# Patient Record
Sex: FEMALE | Race: WHITE | NOT HISPANIC OR LATINO | Employment: FULL TIME | URBAN - METROPOLITAN AREA
[De-identification: names, ages, dates, MRNs, and addresses within clinical notes are randomized per-mention and may not be internally consistent; named-entity substitution may affect disease eponyms.]

---

## 2020-01-04 ENCOUNTER — OFFICE VISIT (OUTPATIENT)
Dept: URGENT CARE | Facility: CLINIC | Age: 45
End: 2020-01-04
Payer: COMMERCIAL

## 2020-01-04 VITALS
DIASTOLIC BLOOD PRESSURE: 82 MMHG | WEIGHT: 196 LBS | TEMPERATURE: 99 F | HEART RATE: 68 BPM | BODY MASS INDEX: 30.76 KG/M2 | SYSTOLIC BLOOD PRESSURE: 124 MMHG | OXYGEN SATURATION: 97 % | HEIGHT: 67 IN

## 2020-01-04 DIAGNOSIS — R10.13 EPIGASTRIC PAIN: Primary | ICD-10-CM

## 2020-01-04 LAB
BILIRUB UR QL STRIP: NEGATIVE
GLUCOSE UR QL STRIP: NEGATIVE
KETONES UR QL STRIP: NEGATIVE
LEUKOCYTE ESTERASE UR QL STRIP: NEGATIVE
PH, POC UA: 6 (ref 5–8)
POC BLOOD, URINE: NEGATIVE
POC NITRATES, URINE: NEGATIVE
PROT UR QL STRIP: NEGATIVE
SP GR UR STRIP: 1.01 (ref 1–1.03)
UROBILINOGEN UR STRIP-ACNC: NORMAL (ref 0.1–1.1)

## 2020-01-04 PROCEDURE — 74019 XR ABDOMEN FLAT AND ERECT: ICD-10-PCS | Mod: FY,S$GLB,, | Performed by: RADIOLOGY

## 2020-01-04 PROCEDURE — 86677 HELICOBACTER PYLORI ANTIBODY: CPT

## 2020-01-04 PROCEDURE — 93010 EKG 12-LEAD: ICD-10-PCS | Mod: S$GLB,,, | Performed by: INTERNAL MEDICINE

## 2020-01-04 PROCEDURE — 74019 RADEX ABDOMEN 2 VIEWS: CPT | Mod: FY,S$GLB,, | Performed by: RADIOLOGY

## 2020-01-04 PROCEDURE — 93010 ELECTROCARDIOGRAM REPORT: CPT | Mod: S$GLB,,, | Performed by: INTERNAL MEDICINE

## 2020-01-04 PROCEDURE — 81003 URINALYSIS AUTO W/O SCOPE: CPT | Mod: QW,S$GLB,, | Performed by: NURSE PRACTITIONER

## 2020-01-04 PROCEDURE — 81003 POCT URINALYSIS, DIPSTICK, MANUAL, W/O SCOPE: ICD-10-PCS | Mod: QW,S$GLB,, | Performed by: NURSE PRACTITIONER

## 2020-01-04 PROCEDURE — 93005 EKG 12-LEAD: ICD-10-PCS | Mod: S$GLB,,, | Performed by: NURSE PRACTITIONER

## 2020-01-04 PROCEDURE — 99215 OFFICE O/P EST HI 40 MIN: CPT | Mod: S$GLB,,, | Performed by: NURSE PRACTITIONER

## 2020-01-04 PROCEDURE — 99215 PR OFFICE/OUTPT VISIT, EST, LEVL V, 40-54 MIN: ICD-10-PCS | Mod: S$GLB,,, | Performed by: NURSE PRACTITIONER

## 2020-01-04 PROCEDURE — 93005 ELECTROCARDIOGRAM TRACING: CPT | Mod: S$GLB,,, | Performed by: NURSE PRACTITIONER

## 2020-01-04 RX ORDER — ONDANSETRON 4 MG/1
4 TABLET, FILM COATED ORAL DAILY PRN
Qty: 30 TABLET | Refills: 1 | Status: SHIPPED | OUTPATIENT
Start: 2020-01-04 | End: 2021-01-03

## 2020-01-04 RX ORDER — ACETAMINOPHEN 500 MG
1000 TABLET ORAL
Status: COMPLETED | OUTPATIENT
Start: 2020-01-04 | End: 2020-01-04

## 2020-01-04 RX ORDER — ONDANSETRON 4 MG/1
4 TABLET, ORALLY DISINTEGRATING ORAL
Status: COMPLETED | OUTPATIENT
Start: 2020-01-04 | End: 2020-01-04

## 2020-01-04 RX ADMIN — ONDANSETRON 4 MG: 4 TABLET, ORALLY DISINTEGRATING ORAL at 02:01

## 2020-01-04 RX ADMIN — Medication 1000 MG: at 02:01

## 2020-01-04 NOTE — PROGRESS NOTES
"Subjective:       Patient ID: Sunita Gan is a 44 y.o. female.    Vitals:  height is 5' 7" (1.702 m) and weight is 88.9 kg (196 lb). Her temperature is 98.5 °F (36.9 °C). Her blood pressure is 124/82 and her pulse is 68. Her oxygen saturation is 97%.     Chief Complaint: Abdominal Pain    Pt has mid abdominal pain where she feels like something is stuck in her stomach. Pt is having regular bowel movements, vomited last night.    Abdominal Pain   This is a new problem. Episode onset: 3 days. The pain is located in the generalized abdominal region. The pain is at a severity of 6/10. The quality of the pain is a sensation of fullness. The abdominal pain radiates to the epigastric region. Associated symptoms include nausea. Pertinent negatives include no constipation, diarrhea, dysuria, fever or vomiting. The pain is aggravated by eating. The pain is relieved by nothing. Treatments tried: increase fluids. There is no history of abdominal surgery.       Constitution: Negative for appetite change, chills, sweating and fever.   HENT: Negative for trouble swallowing.    Cardiovascular: Negative for chest pain.   Respiratory: Negative for shortness of breath.    Gastrointestinal: Positive for abdominal pain and nausea. Negative for abdominal trauma, abdominal bloating, history of abdominal surgery, vomiting, constipation, diarrhea, dark colored stools and heartburn.   Genitourinary: Negative for dysuria, missed menses and pelvic pain.   Musculoskeletal: Negative for back pain.       Objective:      Physical Exam      Assessment:       1. Epigastric pain        Plan:         Epigastric pain  -     IN OFFICE EKG 12-LEAD (to Muse)  -     POCT Urinalysis, Dipstick, Manual, W/O Scope  -     X-Ray Abdomen Flat And Erect  -     H.Pylori Antibody IgG  -     GI cocktail (mylanta 30 mL, lidocaine 2 % viscous 10 mL, dicyclomine 10 mL) 50 mL; Take 10 mLs by mouth 3 (three) times daily as needed.  Dispense: 100 mL; Refill: 0  -     " acetaminophen tablet 1,000 mg    Other orders  -     (pyxis) gi cocktail (mylanta 30 mL, lidocaine 2 % viscous 10 mL, dicyclomine 10 mL) 50 mL         disucssed with Dr Price assessment and differential dx  1) GI cocktail  2) Zofran 4mg ordered and given  3) Tylenol ordered and given  4) EGG: Normal sinus rhythm   5) abd xray ordered with no air space consolidation, no free air, no soft tissue masses noted    Discussed watchful waiting with patient if pain is worse then what it is right now then go to the ED for further evaluation   .Start with fever and chilld go to the ED.    Please return here or go to the Emergency Department for any concerns or worsening of condition.  If you were prescribed antibiotics, please take them to completion.  If you were prescribed a narcotic medication, do not drive or operate heavy equipment or machinery while taking these medications.  Please follow up with your primary care doctor or specialist as needed.    If you  smoke, please stop smoking.

## 2020-01-04 NOTE — PATIENT INSTRUCTIONS
disucssed with Dr Price assessment and differential dx    Discussed watchful waiting with patient if pain is worse then what it is right now then go to the ED for further evaluation   .Start with fever and chilld go to the ED.    Please return here or go to the Emergency Department for any concerns or worsening of condition.  If you were prescribed antibiotics, please take them to completion.  If you were prescribed a narcotic medication, do not drive or operate heavy equipment or machinery while taking these medications.  Please follow up with your primary care doctor or specialist as needed.

## 2020-01-08 ENCOUNTER — TELEPHONE (OUTPATIENT)
Dept: URGENT CARE | Facility: CLINIC | Age: 45
End: 2020-01-08

## 2020-01-08 LAB — H PYLORI IGG SERPL QL IA: NEGATIVE

## 2020-01-08 NOTE — TELEPHONE ENCOUNTER
----- Message from Nan Laureano MD sent at 1/8/2020 12:44 PM CST -----  Please notify that test for bacteria returned negative.  Recheck with PCP or to ER with any worsening symptoms

## 2022-07-14 NOTE — PROGRESS NOTES
Subjective:       Patient ID: Sunita Gan is a pleasant 47 y.o. White female patient    Chief Complaint: Establish Care      Patient is a pt new to me and to our practice.    HPI     She comes to establish care with new PCP. Moved to [place] to come back home, pleased of this. She is closer to her mother and her 13 year old daughter, Muna, is pleased to be here, she likes to have her cousins and grand mother closer.  Ms. Gan has been working on her lifestyle, she has a nutritionist, follows a program called [employer], and so far lost 20 pounds from 06/08/2022. She started to exercise too  She was placed on a statin in March and hopes to be able to hold it at some point due to her better lifestyle.  She has two sisters who got breast cancer but BRCA negative.   She had Td in 2016.       Patient Active Problem List   Diagnosis    BMI 29.0-29.9,adult    Hyperlipidemia          ACTIVE MEDICAL ISSUES:  Documented in Problem List     PAST MEDICAL HISTORY  Documented     PAST SURGICAL HISTORY:  Documented     SOCIAL HISTORY:  Documented     FAMILY HISTORY:  Documented     ALLERGIES AND MEDICATIONS: updated and reviewed.  Documented    Review of Systems   Constitutional: Negative for activity change and unexpected weight change.   HENT: Negative for hearing loss, rhinorrhea and trouble swallowing.    Eyes: Negative for discharge and visual disturbance.   Respiratory: Negative for chest tightness and wheezing.    Cardiovascular: Negative for chest pain and palpitations.   Gastrointestinal: Negative for blood in stool, constipation, diarrhea and vomiting.   Endocrine: Negative for polydipsia and polyuria.   Genitourinary: Negative for difficulty urinating, dysuria, hematuria and menstrual problem.   Musculoskeletal: Negative for arthralgias, joint swelling and neck pain.   Neurological: Negative for weakness and headaches.   Psychiatric/Behavioral: Negative for confusion and dysphoric mood.       Objective:     "  Physical Exam  Vitals and nursing note reviewed.   Constitutional:       Appearance: She is well-developed.   HENT:      Right Ear: External ear normal.      Left Ear: External ear normal.   Eyes:      Conjunctiva/sclera: Conjunctivae normal.   Neck:      Thyroid: No thyromegaly.   Cardiovascular:      Rate and Rhythm: Normal rate and regular rhythm.      Pulses: Normal pulses.      Heart sounds: Normal heart sounds.   Pulmonary:      Effort: Pulmonary effort is normal. No respiratory distress.      Breath sounds: Normal breath sounds. No wheezing.   Abdominal:      General: Bowel sounds are normal.      Palpations: Abdomen is soft. There is no mass.      Tenderness: There is no abdominal tenderness.   Musculoskeletal:         General: Normal range of motion.      Cervical back: Normal range of motion and neck supple.   Lymphadenopathy:      Cervical: No cervical adenopathy.   Skin:     General: Skin is warm and dry.   Neurological:      Mental Status: She is alert and oriented to person, place, and time. Mental status is at baseline.   Psychiatric:         Mood and Affect: Mood normal.         Behavior: Behavior normal.         Thought Content: Thought content normal.         Judgment: Judgment normal.         Vitals:    07/15/22 1411   BP: 118/70   BP Location: Right arm   Patient Position: Sitting   BP Method: Small (Manual)   Pulse: 75   Resp: 16   Temp: 98.9 °F (37.2 °C)   TempSrc: Oral   SpO2: 98%   Weight: 86.3 kg (190 lb 4.1 oz)   Height: 5' 7" (1.702 m)     Body mass index is 29.8 kg/m².    RESULTS: Reviewed labs in [facility].    Last Lab Results:     Lab Results   Component Value Date    WBC 11.53 (H) 05/19/2012    HGB 13.3 05/19/2012    HCT 39.0 05/19/2012     05/19/2012     05/19/2012    K 3.6 05/19/2012     05/19/2012    CO2 24 05/19/2012    BUN 11 05/19/2012    CREATININE 0.8 05/19/2012    CALCIUM 9.2 05/19/2012    ALBUMIN 4.0 05/19/2012    AST 15 05/19/2012    ALT 11 05/19/2012 "       Assessment:       1. Encounter for annual physical exam    2. Establishing care with new doctor, encounter for    3. BMI 29.0-29.9,adult    4. Hyperlipidemia, unspecified hyperlipidemia type    5. Screening for colon cancer    6. Encounter for screening mammogram for malignant neoplasm of breast        Plan:   Sunita was seen today for establish care.    Diagnoses and all orders for this visit:    Encounter for annual physical exam  -     CBC Auto Differential; Future  -     Comprehensive Metabolic Panel; Future  -     Hemoglobin A1C; Future  -     TSH; Future  -     Lipid Panel; Future  -     Hepatitis C Antibody; Future  -     HIV 1/2 Ag/Ab (4th Gen); Future  -     Ambulatory referral/consult to Obstetrics / Gynecology; Future    Will do usual blood work, discussed preventative measures, will refer for colono and mammo.    Establishing care with new doctor, encounter for    Discussed the importance of a good pt-doctor trust relationship. Provided pt with my contact.    BMI 29.0-29.9,adult    See HPI. Praised her for her nice efforts.    Hyperlipidemia, unspecified hyperlipidemia type  -     rosuvastatin (CRESTOR) 5 MG tablet; Take 1 tablet (5 mg total) by mouth once daily.    Will go on with same treatment for now, will monitor.    Screening for colon cancer  -     Case Request Endoscopy: COLONOSCOPY    Encounter for screening mammogram for malignant neoplasm of breast  -     Mammo Digital Diagnostic Bilat with [facility]; Future      No follow-ups on file.    This note was created by combination of typed  and M-Modal dictation.  Transcription errors may be present.  If there are any questions, please contact me.

## 2022-07-15 ENCOUNTER — OFFICE VISIT (OUTPATIENT)
Dept: FAMILY MEDICINE | Facility: CLINIC | Age: 47
End: 2022-07-15
Payer: COMMERCIAL

## 2022-07-15 VITALS
HEIGHT: 67 IN | HEART RATE: 75 BPM | RESPIRATION RATE: 16 BRPM | BODY MASS INDEX: 29.86 KG/M2 | OXYGEN SATURATION: 98 % | WEIGHT: 190.25 LBS | SYSTOLIC BLOOD PRESSURE: 118 MMHG | DIASTOLIC BLOOD PRESSURE: 70 MMHG | TEMPERATURE: 99 F

## 2022-07-15 DIAGNOSIS — Z76.89 ESTABLISHING CARE WITH NEW DOCTOR, ENCOUNTER FOR: ICD-10-CM

## 2022-07-15 DIAGNOSIS — Z12.11 SCREENING FOR COLON CANCER: ICD-10-CM

## 2022-07-15 DIAGNOSIS — E78.5 HYPERLIPIDEMIA, UNSPECIFIED HYPERLIPIDEMIA TYPE: ICD-10-CM

## 2022-07-15 DIAGNOSIS — Z00.00 ENCOUNTER FOR ANNUAL PHYSICAL EXAM: Primary | ICD-10-CM

## 2022-07-15 DIAGNOSIS — Z12.31 ENCOUNTER FOR SCREENING MAMMOGRAM FOR MALIGNANT NEOPLASM OF BREAST: ICD-10-CM

## 2022-07-15 PROCEDURE — 99386 PR PREVENTIVE VISIT,NEW,40-64: ICD-10-PCS | Mod: S$GLB,,, | Performed by: INTERNAL MEDICINE

## 2022-07-15 PROCEDURE — 3008F BODY MASS INDEX DOCD: CPT | Mod: CPTII,S$GLB,, | Performed by: INTERNAL MEDICINE

## 2022-07-15 PROCEDURE — 1159F MED LIST DOCD IN RCRD: CPT | Mod: CPTII,S$GLB,, | Performed by: INTERNAL MEDICINE

## 2022-07-15 PROCEDURE — 99999 PR PBB SHADOW E&M-EST. PATIENT-LVL V: ICD-10-PCS | Mod: PBBFAC,,, | Performed by: INTERNAL MEDICINE

## 2022-07-15 PROCEDURE — 3078F DIAST BP <80 MM HG: CPT | Mod: CPTII,S$GLB,, | Performed by: INTERNAL MEDICINE

## 2022-07-15 PROCEDURE — 1160F RVW MEDS BY RX/DR IN RCRD: CPT | Mod: CPTII,S$GLB,, | Performed by: INTERNAL MEDICINE

## 2022-07-15 PROCEDURE — 99386 PREV VISIT NEW AGE 40-64: CPT | Mod: S$GLB,,, | Performed by: INTERNAL MEDICINE

## 2022-07-15 PROCEDURE — 3074F PR MOST RECENT SYSTOLIC BLOOD PRESSURE < 130 MM HG: ICD-10-PCS | Mod: CPTII,S$GLB,, | Performed by: INTERNAL MEDICINE

## 2022-07-15 PROCEDURE — 3074F SYST BP LT 130 MM HG: CPT | Mod: CPTII,S$GLB,, | Performed by: INTERNAL MEDICINE

## 2022-07-15 PROCEDURE — 1159F PR MEDICATION LIST DOCUMENTED IN MEDICAL RECORD: ICD-10-PCS | Mod: CPTII,S$GLB,, | Performed by: INTERNAL MEDICINE

## 2022-07-15 PROCEDURE — 1160F PR REVIEW ALL MEDS BY PRESCRIBER/CLIN PHARMACIST DOCUMENTED: ICD-10-PCS | Mod: CPTII,S$GLB,, | Performed by: INTERNAL MEDICINE

## 2022-07-15 PROCEDURE — 3078F PR MOST RECENT DIASTOLIC BLOOD PRESSURE < 80 MM HG: ICD-10-PCS | Mod: CPTII,S$GLB,, | Performed by: INTERNAL MEDICINE

## 2022-07-15 PROCEDURE — 99999 PR PBB SHADOW E&M-EST. PATIENT-LVL V: CPT | Mod: PBBFAC,,, | Performed by: INTERNAL MEDICINE

## 2022-07-15 PROCEDURE — 3008F PR BODY MASS INDEX (BMI) DOCUMENTED: ICD-10-PCS | Mod: CPTII,S$GLB,, | Performed by: INTERNAL MEDICINE

## 2022-07-15 RX ORDER — ROSUVASTATIN CALCIUM 5 MG/1
5 TABLET, COATED ORAL DAILY
Qty: 90 TABLET | Refills: 3 | Status: SHIPPED | OUTPATIENT
Start: 2022-07-15 | End: 2023-07-23

## 2022-07-15 NOTE — PROGRESS NOTES
Health Maintenance Due   Topic     Hepatitis C Screening  CONSULT WITH PCP    Cervical Cancer Screening  CONSULT WITH PCP    Lipid Panel  CONSULT WITH PCP    HIV Screening  CONSULT WITH PCP    TETANUS VACCINE  CONSULT WITH PCP    Mammogram  CONSULT WITH PCP    COVID-19 Vaccine (3 - Booster for Pfizer series) Pt will call to schedule when ready     Colorectal Cancer Screening  CONSULT WITH PCP

## 2022-07-18 PROBLEM — E78.5 HYPERLIPIDEMIA: Status: ACTIVE | Noted: 2022-07-18

## 2022-07-25 ENCOUNTER — PATIENT MESSAGE (OUTPATIENT)
Dept: ADMINISTRATIVE | Facility: HOSPITAL | Age: 47
End: 2022-07-25
Payer: COMMERCIAL

## 2022-08-11 ENCOUNTER — OFFICE VISIT (OUTPATIENT)
Dept: URGENT CARE | Facility: CLINIC | Age: 47
End: 2022-08-11
Payer: COMMERCIAL

## 2022-08-11 VITALS
HEART RATE: 83 BPM | WEIGHT: 190.25 LBS | OXYGEN SATURATION: 97 % | RESPIRATION RATE: 16 BRPM | SYSTOLIC BLOOD PRESSURE: 121 MMHG | HEIGHT: 67 IN | BODY MASS INDEX: 29.86 KG/M2 | DIASTOLIC BLOOD PRESSURE: 80 MMHG | TEMPERATURE: 98 F

## 2022-08-11 DIAGNOSIS — H65.01 NON-RECURRENT ACUTE SEROUS OTITIS MEDIA OF RIGHT EAR: Primary | ICD-10-CM

## 2022-08-11 DIAGNOSIS — J03.90 EXUDATIVE TONSILLITIS: ICD-10-CM

## 2022-08-11 DIAGNOSIS — J02.9 SORE THROAT: ICD-10-CM

## 2022-08-11 LAB
CTP QC/QA: YES
CTP QC/QA: YES
MOLECULAR STREP A: NEGATIVE
SARS-COV-2 RDRP RESP QL NAA+PROBE: NEGATIVE

## 2022-08-11 PROCEDURE — 99213 OFFICE O/P EST LOW 20 MIN: CPT | Mod: S$GLB,,,

## 2022-08-11 PROCEDURE — 99213 PR OFFICE/OUTPT VISIT, EST, LEVL III, 20-29 MIN: ICD-10-PCS | Mod: S$GLB,,,

## 2022-08-11 PROCEDURE — U0002: ICD-10-PCS | Mod: QW,S$GLB,,

## 2022-08-11 PROCEDURE — 87651 POCT STREP A MOLECULAR: ICD-10-PCS | Mod: QW,S$GLB,,

## 2022-08-11 PROCEDURE — 3074F SYST BP LT 130 MM HG: CPT | Mod: CPTII,S$GLB,,

## 2022-08-11 PROCEDURE — 3079F PR MOST RECENT DIASTOLIC BLOOD PRESSURE 80-89 MM HG: ICD-10-PCS | Mod: CPTII,S$GLB,,

## 2022-08-11 PROCEDURE — 1160F PR REVIEW ALL MEDS BY PRESCRIBER/CLIN PHARMACIST DOCUMENTED: ICD-10-PCS | Mod: CPTII,S$GLB,,

## 2022-08-11 PROCEDURE — 3008F PR BODY MASS INDEX (BMI) DOCUMENTED: ICD-10-PCS | Mod: CPTII,S$GLB,,

## 2022-08-11 PROCEDURE — 1159F MED LIST DOCD IN RCRD: CPT | Mod: CPTII,S$GLB,,

## 2022-08-11 PROCEDURE — 3079F DIAST BP 80-89 MM HG: CPT | Mod: CPTII,S$GLB,,

## 2022-08-11 PROCEDURE — 1159F PR MEDICATION LIST DOCUMENTED IN MEDICAL RECORD: ICD-10-PCS | Mod: CPTII,S$GLB,,

## 2022-08-11 PROCEDURE — 87651 STREP A DNA AMP PROBE: CPT | Mod: QW,S$GLB,,

## 2022-08-11 PROCEDURE — U0002 COVID-19 LAB TEST NON-CDC: HCPCS | Mod: QW,S$GLB,,

## 2022-08-11 PROCEDURE — 3074F PR MOST RECENT SYSTOLIC BLOOD PRESSURE < 130 MM HG: ICD-10-PCS | Mod: CPTII,S$GLB,,

## 2022-08-11 PROCEDURE — 3008F BODY MASS INDEX DOCD: CPT | Mod: CPTII,S$GLB,,

## 2022-08-11 PROCEDURE — 1160F RVW MEDS BY RX/DR IN RCRD: CPT | Mod: CPTII,S$GLB,,

## 2022-08-11 RX ORDER — UBIDECARENONE 30 MG
30 CAPSULE ORAL
COMMUNITY

## 2022-08-11 RX ORDER — AMOXICILLIN AND CLAVULANATE POTASSIUM 875; 125 MG/1; MG/1
1 TABLET, FILM COATED ORAL 2 TIMES DAILY
Qty: 20 TABLET | Refills: 0 | Status: SHIPPED | OUTPATIENT
Start: 2022-08-11 | End: 2022-08-21

## 2022-08-11 RX ORDER — MULTIVITAMIN
1 TABLET ORAL
COMMUNITY

## 2022-08-11 NOTE — PROGRESS NOTES
"Subjective:       Patient ID: Sunita Gan is a 47 y.o. female.    Vitals:  height is 5' 7" (1.702 m) and weight is 86.3 kg (190 lb 4.1 oz). Her temperature is 98.3 °F (36.8 °C). Her blood pressure is 121/80 and her pulse is 83. Her respiration is 16 and oxygen saturation is 97%.     Chief Complaint: Sore Throat    48 yo female complains of severe sore throat and and right ear fullness. Pt states that symptoms started on Saturday and have gotten worse.  She went to her local urgent care by her house and tested negative for strep and COVID on Saturday, 08/06/2022.  She complains of the right side of her throat a.m. with associated pus on her throat.  Patient has tenderness to palpation of her jaw.  She has been taking Zyrtec and Benadryl without any relief of symptoms.    Sore Throat   This is a new problem. The current episode started in the past 7 days. The problem has been unchanged. The pain is worse on the right side. There has been no fever. The pain is at a severity of 7/10. The pain is moderate. Associated symptoms include ear pain, a plugged ear sensation, swollen glands and trouble swallowing. Pertinent negatives include no congestion. Associated symptoms comments:   . She has had exposure to strep. Treatments tried: zyrtec and benadryl. The treatment provided no relief.       Constitution: Negative for chills, sweating, fatigue and fever.   HENT: Positive for ear pain, sore throat and trouble swallowing. Negative for congestion, postnasal drip, sinus pain and sinus pressure.        Objective:      Physical Exam   Constitutional: She is oriented to person, place, and time. She appears well-developed. She is cooperative.  Non-toxic appearance. She does not appear ill. No distress.   HENT:   Head: Normocephalic and atraumatic.   Ears:   Right Ear: Hearing, external ear and ear canal normal. Tympanic membrane is erythematous and bulging.   Left Ear: Hearing, tympanic membrane, external ear and ear canal normal. " Tympanic membrane is not erythematous and not bulging.   Nose: Nose normal. No mucosal edema, rhinorrhea or nasal deformity. No epistaxis. Right sinus exhibits no maxillary sinus tenderness and no frontal sinus tenderness. Left sinus exhibits no maxillary sinus tenderness and no frontal sinus tenderness.   Mouth/Throat: Uvula is midline and mucous membranes are normal. No trismus in the jaw. Normal dentition. No uvula swelling. Posterior oropharyngeal erythema present. Tonsils are 2+ on the right. Tonsils are 1+ on the left. Tonsillar exudate.   Eyes: Conjunctivae and lids are normal. Right eye exhibits no discharge. Left eye exhibits no discharge. No scleral icterus.   Neck: Trachea normal and phonation normal. Neck supple.   Cardiovascular: Normal rate, regular rhythm, normal heart sounds and normal pulses.   Pulmonary/Chest: Effort normal and breath sounds normal. No respiratory distress.   Abdominal: Normal appearance and bowel sounds are normal. She exhibits no distension and no mass. Soft. There is no abdominal tenderness.   Musculoskeletal: Normal range of motion.         General: No deformity. Normal range of motion.   Neurological: She is alert and oriented to person, place, and time. She exhibits normal muscle tone. Coordination normal.   Skin: Skin is warm, dry, intact, not diaphoretic and not pale.   Psychiatric: Her speech is normal and behavior is normal. Judgment and thought content normal.   Nursing note and vitals reviewed.        Results for orders placed or performed in visit on 08/11/22   POCT COVID-19 Rapid Screening   Result Value Ref Range    POC Rapid COVID Negative Negative     Acceptable Yes    POCT Strep A, Molecular   Result Value Ref Range    Molecular Strep A, POC Negative Negative     Acceptable Yes        Assessment:       1. Non-recurrent acute serous otitis media of right ear    2. Sore throat    3. Exudative tonsillitis          Plan:       Reviewed  diagnosis of an ear infection with patient who verbalized understanding.  We discussed the treatment plan which also included antibiotics and over-the-counter medications to help with allergy symptoms as well.  Patient verbalized understanding agrees with plan of care.  She denied any further questions or concerns at this time.  Patient exits exam room in no acute distress    Non-recurrent acute serous otitis media of right ear  -     amoxicillin-clavulanate 875-125mg (AUGMENTIN) 875-125 mg per tablet; Take 1 tablet by mouth 2 (two) times daily. for 10 days  Dispense: 20 tablet; Refill: 0    Sore throat  -     POCT COVID-19 Rapid Screening  -     POCT Strep A, Molecular    Exudative tonsillitis

## 2022-08-24 ENCOUNTER — PATIENT MESSAGE (OUTPATIENT)
Dept: FAMILY MEDICINE | Facility: CLINIC | Age: 47
End: 2022-08-24
Payer: COMMERCIAL

## 2022-10-10 ENCOUNTER — PATIENT MESSAGE (OUTPATIENT)
Dept: ADMINISTRATIVE | Facility: HOSPITAL | Age: 47
End: 2022-10-10
Payer: COMMERCIAL

## 2022-10-24 ENCOUNTER — HOSPITAL ENCOUNTER (OUTPATIENT)
Dept: RADIOLOGY | Facility: OTHER | Age: 47
Discharge: HOME | End: 2022-10-24
Attending: INTERNAL MEDICINE
Payer: COMMERCIAL

## 2022-10-24 DIAGNOSIS — Z12.31 ENCOUNTER FOR SCREENING MAMMOGRAM FOR MALIGNANT NEOPLASM OF BREAST: ICD-10-CM

## 2022-10-24 PROCEDURE — 77063 MAMMO DIGITAL SCREENING BILAT WITH TOMO: ICD-10-PCS | Mod: 26,,, | Performed by: RADIOLOGY

## 2022-10-24 PROCEDURE — 77063 BREAST TOMOSYNTHESIS BI: CPT | Mod: 26,,, | Performed by: RADIOLOGY

## 2022-10-24 PROCEDURE — 77067 MAMMO DIGITAL SCREENING BILAT WITH TOMO: ICD-10-PCS | Mod: 26,,, | Performed by: RADIOLOGY

## 2022-10-24 PROCEDURE — 77063 BREAST TOMOSYNTHESIS BI: CPT | Mod: TC

## 2022-10-24 PROCEDURE — 77067 SCR MAMMO BI INCL CAD: CPT | Mod: 26,,, | Performed by: RADIOLOGY

## 2022-11-05 DIAGNOSIS — Z12.11 ENCOUNTER FOR SCREENING COLONOSCOPY FOR NON-HIGH-RISK PATIENT: Primary | ICD-10-CM

## 2022-11-15 ENCOUNTER — PATIENT MESSAGE (OUTPATIENT)
Dept: FAMILY MEDICINE | Facility: CLINIC | Age: 47
End: 2022-11-15
Payer: COMMERCIAL

## 2022-12-21 ENCOUNTER — OFFICE VISIT (OUTPATIENT)
Dept: OBSTETRICS AND GYNECOLOGY | Facility: CLINIC | Age: 47
End: 2022-12-21
Payer: COMMERCIAL

## 2022-12-21 VITALS
BODY MASS INDEX: 25.58 KG/M2 | SYSTOLIC BLOOD PRESSURE: 120 MMHG | DIASTOLIC BLOOD PRESSURE: 72 MMHG | WEIGHT: 163 LBS | HEIGHT: 67 IN

## 2022-12-21 DIAGNOSIS — Z11.51 SCREENING FOR HPV (HUMAN PAPILLOMAVIRUS): ICD-10-CM

## 2022-12-21 DIAGNOSIS — Z91.89 INCREASED RISK OF BREAST CANCER: ICD-10-CM

## 2022-12-21 DIAGNOSIS — Z11.3 SCREEN FOR STD (SEXUALLY TRANSMITTED DISEASE): ICD-10-CM

## 2022-12-21 DIAGNOSIS — Z12.4 ENCOUNTER FOR PAPANICOLAOU SMEAR FOR CERVICAL CANCER SCREENING: ICD-10-CM

## 2022-12-21 DIAGNOSIS — Z00.00 ENCOUNTER FOR ANNUAL PHYSICAL EXAM: Primary | ICD-10-CM

## 2022-12-21 LAB
C TRACH DNA SPEC QL NAA+PROBE: NOT DETECTED
N GONORRHOEA DNA SPEC QL NAA+PROBE: NOT DETECTED

## 2022-12-21 PROCEDURE — 88175 CYTOPATH C/V AUTO FLUID REDO: CPT | Performed by: REGISTERED NURSE

## 2022-12-21 PROCEDURE — 81514 NFCT DS BV&VAGINITIS DNA ALG: CPT | Performed by: REGISTERED NURSE

## 2022-12-21 PROCEDURE — 3078F PR MOST RECENT DIASTOLIC BLOOD PRESSURE < 80 MM HG: ICD-10-PCS | Mod: CPTII,S$GLB,, | Performed by: REGISTERED NURSE

## 2022-12-21 PROCEDURE — 1159F PR MEDICATION LIST DOCUMENTED IN MEDICAL RECORD: ICD-10-PCS | Mod: CPTII,S$GLB,, | Performed by: REGISTERED NURSE

## 2022-12-21 PROCEDURE — 1160F RVW MEDS BY RX/DR IN RCRD: CPT | Mod: CPTII,S$GLB,, | Performed by: REGISTERED NURSE

## 2022-12-21 PROCEDURE — 87624 HPV HI-RISK TYP POOLED RSLT: CPT | Performed by: REGISTERED NURSE

## 2022-12-21 PROCEDURE — 3074F SYST BP LT 130 MM HG: CPT | Mod: CPTII,S$GLB,, | Performed by: REGISTERED NURSE

## 2022-12-21 PROCEDURE — 1160F PR REVIEW ALL MEDS BY PRESCRIBER/CLIN PHARMACIST DOCUMENTED: ICD-10-PCS | Mod: CPTII,S$GLB,, | Performed by: REGISTERED NURSE

## 2022-12-21 PROCEDURE — 3074F PR MOST RECENT SYSTOLIC BLOOD PRESSURE < 130 MM HG: ICD-10-PCS | Mod: CPTII,S$GLB,, | Performed by: REGISTERED NURSE

## 2022-12-21 PROCEDURE — 3044F PR MOST RECENT HEMOGLOBIN A1C LEVEL <7.0%: ICD-10-PCS | Mod: CPTII,S$GLB,, | Performed by: REGISTERED NURSE

## 2022-12-21 PROCEDURE — 99386 PR PREVENTIVE VISIT,NEW,40-64: ICD-10-PCS | Mod: S$GLB,,, | Performed by: REGISTERED NURSE

## 2022-12-21 PROCEDURE — 99386 PREV VISIT NEW AGE 40-64: CPT | Mod: S$GLB,,, | Performed by: REGISTERED NURSE

## 2022-12-21 PROCEDURE — 3044F HG A1C LEVEL LT 7.0%: CPT | Mod: CPTII,S$GLB,, | Performed by: REGISTERED NURSE

## 2022-12-21 PROCEDURE — 99999 PR PBB SHADOW E&M-EST. PATIENT-LVL III: CPT | Mod: PBBFAC,,, | Performed by: REGISTERED NURSE

## 2022-12-21 PROCEDURE — 87491 CHLMYD TRACH DNA AMP PROBE: CPT | Performed by: REGISTERED NURSE

## 2022-12-21 PROCEDURE — 3008F PR BODY MASS INDEX (BMI) DOCUMENTED: ICD-10-PCS | Mod: CPTII,S$GLB,, | Performed by: REGISTERED NURSE

## 2022-12-21 PROCEDURE — 99999 PR PBB SHADOW E&M-EST. PATIENT-LVL III: ICD-10-PCS | Mod: PBBFAC,,, | Performed by: REGISTERED NURSE

## 2022-12-21 PROCEDURE — 3008F BODY MASS INDEX DOCD: CPT | Mod: CPTII,S$GLB,, | Performed by: REGISTERED NURSE

## 2022-12-21 PROCEDURE — 87591 N.GONORRHOEAE DNA AMP PROB: CPT | Performed by: REGISTERED NURSE

## 2022-12-21 PROCEDURE — 3078F DIAST BP <80 MM HG: CPT | Mod: CPTII,S$GLB,, | Performed by: REGISTERED NURSE

## 2022-12-21 PROCEDURE — 1159F MED LIST DOCD IN RCRD: CPT | Mod: CPTII,S$GLB,, | Performed by: REGISTERED NURSE

## 2022-12-21 NOTE — PROGRESS NOTES
CC: Annual  HPI: Pt is a 47 y.o.  female who presents for routine annual exam. She is not currently sexually active; she is not using contraception. She does want STD screening (swabs).  Denies any GYN complaints.  The patient participates in regular exercise: tries to.  The patient does not smoke.  The patient wears seatbelts.   Pt denies any domestic violence. She moved to [place] from [place], [place] recently and is working as  at [employer].    Last pap: unsure  Last mammo: 10/22- WNL; TC score: 23.51%     FH:  Breast cancer: sister diagnosed at 39 with DCIS, double mastectomy; second sister had prophylactic double mastectomy  Colon cancer: none  Ovarian cancer: none  Endometrial cancer: none    ROS:  GENERAL: Feeling well overall. Denies fever or chills.   SKIN: Denies rash or lesions.   HEAD: Denies head injury or headache.   NODES: Denies enlarged lymph nodes.   CHEST: Denies chest pain or shortness of breath.   CARDIOVASCULAR: Denies palpitations or left sided chest pain.   ABDOMEN: No abdominal pain, constipation, diarrhea, nausea, vomiting or rectal bleeding.   URINARY: No dysuria, hematuria, or burning on urination.  REPRODUCTIVE: See HPI.   BREASTS: Denies pain, lumps, or nipple discharge.   HEMATOLOGIC: No easy bruisability or excessive bleeding.   MUSCULOSKELETAL: Denies joint pain or swelling.   NEUROLOGIC: Denies syncope or weakness.   PSYCHIATRIC: Denies depression, anxiety or mood swings.    PE:   APPEARANCE: Well nourished, well developed, White female in no acute distress.  NODES: no cervical, supraclavicular, or inguinal lymphadenopathy  BREASTS: Symmetrical, no skin changes or visible lesions. No palpable masses, nipple discharge or adenopathy bilaterally.  ABDOMEN: Soft. No tenderness or masses. No distention. No hernias palpated. No CVA tenderness.  VULVA: No lesions. Normal external female genitalia.  URETHRAL MEATUS: Normal size and location, no lesions, no  prolapse.  URETHRA: No masses, tenderness, or prolapse.  VAGINA: Moist. No lesions or lacerations noted. No abnormal discharge present. No odor present.   CERVIX: No lesions or discharge. No cervical motion tenderness.   UTERUS: Normal size, regular shape, mobile, non-tender.  ADNEXA: No tenderness. No fullness or masses palpated in the adnexal regions.   ANUS PERINEUM: Normal.      Diagnosis:  1. Encounter for annual physical exam    2. Increased risk of breast cancer    3. Encounter for Papanicolaou smear for cervical cancer screening    4. Screening for HPV (human papillomavirus)    5. Screen for STD (sexually transmitted disease)        Plan:   Pap/HPV co-testing  Affirm  GC  Referral to [facility] for TC score of 23.51%      Patient was counseled today on the new ACS guidelines for cervical cytology screening as well as the current recommendations for breast cancer screening. She was counseled to follow up with her PCP for other routine health maintenance. Counseling session lasted approximately 10 minutes, and all her questions were answered.    Follow-up with me in 1 year for routine exam.        MARYSE Mcgregor

## 2022-12-22 LAB
BACTERIAL VAGINOSIS DNA: NEGATIVE
CANDIDA GLABRATA DNA: NEGATIVE
CANDIDA KRUSEI DNA: NEGATIVE
CANDIDA RRNA VAG QL PROBE: NEGATIVE
T VAGINALIS RRNA GENITAL QL PROBE: NEGATIVE

## 2022-12-27 ENCOUNTER — CLINICAL SUPPORT (OUTPATIENT)
Dept: ENDOSCOPY | Facility: HOSPITAL | Age: 47
End: 2022-12-27
Attending: INTERNAL MEDICINE
Payer: COMMERCIAL

## 2022-12-27 VITALS — BODY MASS INDEX: 25.58 KG/M2 | HEIGHT: 67 IN | WEIGHT: 163 LBS

## 2022-12-27 DIAGNOSIS — Z12.11 ENCOUNTER FOR SCREENING COLONOSCOPY FOR NON-HIGH-RISK PATIENT: ICD-10-CM

## 2022-12-27 RX ORDER — POLYETHYLENE GLYCOL 3350, SODIUM SULFATE ANHYDROUS, SODIUM BICARBONATE, SODIUM CHLORIDE, POTASSIUM CHLORIDE 236; 22.74; 6.74; 5.86; 2.97 G/4L; G/4L; G/4L; G/4L; G/4L
4 POWDER, FOR SOLUTION ORAL ONCE
Qty: 4000 ML | Refills: 0 | Status: SHIPPED | OUTPATIENT
Start: 2022-12-27 | End: 2022-12-27

## 2022-12-30 LAB
FINAL PATHOLOGIC DIAGNOSIS: NORMAL
Lab: NORMAL

## 2023-01-04 ENCOUNTER — PATIENT MESSAGE (OUTPATIENT)
Dept: OBSTETRICS AND GYNECOLOGY | Facility: CLINIC | Age: 48
End: 2023-01-04
Payer: COMMERCIAL

## 2023-01-04 LAB
HPV HR 12 DNA SPEC QL NAA+PROBE: NEGATIVE
HPV16 AG SPEC QL: NEGATIVE
HPV18 DNA SPEC QL NAA+PROBE: POSITIVE

## 2023-01-06 ENCOUNTER — TELEPHONE (OUTPATIENT)
Dept: OBSTETRICS AND GYNECOLOGY | Facility: CLINIC | Age: 48
End: 2023-01-06
Payer: COMMERCIAL

## 2023-01-06 NOTE — TELEPHONE ENCOUNTER
----- Message from Meredith Martinez MD sent at 1/6/2023  9:06 AM CST -----  Regarding: RE: Colpo  No problem - we can get her scheduled and if she has additional questions Malathi can book her with me for a virtual prior to the appointment!      ----- Message -----  From: Larissa Pierce NP  Sent: 1/5/2023   6:52 PM CST  To: Meredith Martinez MD, [facility] Staff  Subject: Colpo                                            Hey Dr. Martinez,  This patient had normal pap with HPV type 18 + at her annual exam. I discussed colpo with her- she had lots and lots of questions I did my best to answer (see messages), but I let her know if she has further she could schedule virtual visit. Is that ok with you? I am also happy to see her for virtual but figured you could answer any further questions better than me.   Malathi would you mind scheduling her for the colpo?  Thank you very very much!!!  Larissa

## 2023-01-23 ENCOUNTER — PATIENT MESSAGE (OUTPATIENT)
Dept: OBSTETRICS AND GYNECOLOGY | Facility: CLINIC | Age: 48
End: 2023-01-23
Payer: COMMERCIAL

## 2023-03-18 ENCOUNTER — OFFICE VISIT (OUTPATIENT)
Dept: URGENT CARE | Facility: CLINIC | Age: 48
End: 2023-03-18
Payer: COMMERCIAL

## 2023-03-18 VITALS
SYSTOLIC BLOOD PRESSURE: 107 MMHG | DIASTOLIC BLOOD PRESSURE: 74 MMHG | RESPIRATION RATE: 20 BRPM | OXYGEN SATURATION: 97 % | TEMPERATURE: 98 F | BODY MASS INDEX: 25.58 KG/M2 | HEART RATE: 71 BPM | WEIGHT: 163 LBS | HEIGHT: 67 IN

## 2023-03-18 DIAGNOSIS — B96.89 BACTERIAL SINUSITIS: Primary | ICD-10-CM

## 2023-03-18 DIAGNOSIS — J32.9 BACTERIAL SINUSITIS: Primary | ICD-10-CM

## 2023-03-18 PROBLEM — K21.9 GASTROESOPHAGEAL REFLUX DISEASE: Status: ACTIVE | Noted: 2023-03-18

## 2023-03-18 PROBLEM — Z82.41 FAMILY HISTORY OF SUDDEN CARDIAC DEATH: Status: ACTIVE | Noted: 2023-03-18

## 2023-03-18 PROBLEM — E78.00 HYPERCHOLESTEROLEMIA: Status: ACTIVE | Noted: 2023-03-18

## 2023-03-18 PROBLEM — N92.1 METRORRHAGIA: Status: ACTIVE | Noted: 2023-03-18

## 2023-03-18 PROCEDURE — 99213 OFFICE O/P EST LOW 20 MIN: CPT | Mod: S$GLB,,, | Performed by: FAMILY MEDICINE

## 2023-03-18 PROCEDURE — 99213 PR OFFICE/OUTPT VISIT, EST, LEVL III, 20-29 MIN: ICD-10-PCS | Mod: S$GLB,,, | Performed by: FAMILY MEDICINE

## 2023-03-18 RX ORDER — AMOXICILLIN AND CLAVULANATE POTASSIUM 875; 125 MG/1; MG/1
1 TABLET, FILM COATED ORAL 2 TIMES DAILY
Qty: 20 TABLET | Refills: 0 | Status: SHIPPED | OUTPATIENT
Start: 2023-03-18 | End: 2023-03-28

## 2023-03-18 NOTE — PROGRESS NOTES
"Subjective:       Patient ID: Sunita Gan is a 47 y.o. female.    Vitals:  height is 5' 7" (1.702 m) and weight is 73.9 kg (163 lb). Her temperature is 98.1 °F (36.7 °C). Her blood pressure is 107/74 and her pulse is 71. Her respiration is 20 and oxygen saturation is 97%.     Chief Complaint: Facial Pain    Pt stated she is having RT side facial pain present since 3/5/23 but worse in past few days. No injury to area. Also, having fullness in the RT ear. No pain in teeth or gums. Today she is blowing mustard colored mucous from rt nostril    Facial Pain  This is a new problem. Episode onset: 4 days. The problem occurs constantly. The problem has been gradually worsening. Pertinent negatives include no abdominal pain, anorexia, arthralgias, change in bowel habit, chest pain, chills, congestion, coughing, diaphoresis, fatigue, fever, headaches, joint swelling, myalgias, nausea, neck pain, numbness, rash, sore throat, swollen glands, urinary symptoms, vertigo, visual change, vomiting or weakness. She has tried NSAIDs for the symptoms. The treatment provided mild relief.     Constitution: Negative for chills, sweating, fatigue and fever.   HENT:  Negative for congestion and sore throat.         Ear Fullness   Neck: Negative for neck pain.   Cardiovascular:  Negative for chest pain.   Respiratory:  Negative for cough.    Gastrointestinal:  Negative for abdominal pain, nausea and vomiting.   Musculoskeletal:  Negative for joint pain, joint swelling and muscle ache.   Skin:  Negative for rash.   Neurological:  Negative for history of vertigo, headaches and numbness.     Objective:      Physical Exam   Constitutional: She is oriented to person, place, and time. She appears well-developed. She is cooperative.  Non-toxic appearance. She does not appear ill. No distress.   HENT:   Head: Normocephalic and atraumatic.      Comments: Rt maxillary sinus tenderness, no gum or tooth pain on palpation, no eryrthema in " mouth  Ears:   Right Ear: Hearing, tympanic membrane, external ear and ear canal normal.   Left Ear: Hearing, tympanic membrane, external ear and ear canal normal.   Nose: Nose normal. No mucosal edema, rhinorrhea or nasal deformity. No epistaxis. Right sinus exhibits no maxillary sinus tenderness and no frontal sinus tenderness. Left sinus exhibits no maxillary sinus tenderness and no frontal sinus tenderness.   Mouth/Throat: Uvula is midline and oropharynx is clear and moist. Mucous membranes are dry. No trismus in the jaw. Normal dentition. No uvula swelling. No oropharyngeal exudate, posterior oropharyngeal edema or posterior oropharyngeal erythema.   Eyes: Conjunctivae and lids are normal. No scleral icterus.   Neck: Trachea normal and phonation normal. Neck supple. No edema present. No erythema present. No neck rigidity present.   Cardiovascular: Normal rate, regular rhythm, normal heart sounds and normal pulses.   Pulmonary/Chest: Effort normal and breath sounds normal. No respiratory distress. She has no decreased breath sounds. She has no rhonchi.   Abdominal: Normal appearance.   Musculoskeletal: Normal range of motion.         General: No deformity. Normal range of motion.   Neurological: She is alert and oriented to person, place, and time. She exhibits normal muscle tone. Coordination normal.   Skin: Skin is warm, dry, intact, not diaphoretic and not pale.   Psychiatric: Her speech is normal and behavior is normal. Judgment and thought content normal.   Nursing note and vitals reviewed.      Assessment:       1. Bacterial sinusitis          Plan:         Bacterial sinusitis  -     amoxicillin-clavulanate 875-125mg (AUGMENTIN) 875-125 mg per tablet; Take 1 tablet by mouth 2 (two) times daily. for 10 days  Dispense: 20 tablet; Refill: 0    Also use mucionex and flonase   Pt or guardian provided educational materials and instructions regarding their visit diagnosis.

## 2023-03-23 ENCOUNTER — PATIENT MESSAGE (OUTPATIENT)
Dept: ENDOSCOPY | Facility: HOSPITAL | Age: 48
End: 2023-03-23
Payer: COMMERCIAL

## 2023-03-24 ENCOUNTER — TELEPHONE (OUTPATIENT)
Dept: ENDOSCOPY | Facility: HOSPITAL | Age: 48
End: 2023-03-24
Payer: COMMERCIAL

## 2023-03-24 ENCOUNTER — ANESTHESIA EVENT (OUTPATIENT)
Dept: ENDOSCOPY | Facility: HOSPITAL | Age: 48
End: 2023-03-24
Payer: COMMERCIAL

## 2023-03-24 NOTE — ANESTHESIA PREPROCEDURE EVALUATION
03/24/2023  Sunita Gan is a 48 y.o., female.      Pre-op Assessment    I have reviewed the Patient Summary Reports.       I have reviewed the Medications.     Review of Systems  Anesthesia Hx:   Denies Personal Hx of Anesthesia complications.   Hematology/Oncology:  Hematology Normal   Oncology Normal     EENT/Dental:EENT/Dental Normal   Cardiovascular:  Cardiovascular Normal     Pulmonary:  Pulmonary Normal    Renal/:  Renal/ Normal     Hepatic/GI:  Hepatic/GI Normal    Musculoskeletal:  Musculoskeletal Normal    Neurological:  Neurology Normal    Endocrine:  Endocrine Normal    Dermatological:  Skin Normal    Psych:  Psychiatric Normal           Physical Exam  General: Well nourished, Cooperative, Alert and Oriented    Airway:  Mallampati: I   Mouth Opening: Normal  TM Distance: Normal  Tongue: Normal    Dental:  Intact    Chest/Lungs:  Normal Respiratory Rate    Heart:  Rate: Normal  Rhythm: Regular Rhythm        Anesthesia Plan  Type of Anesthesia, risks & benefits discussed:    Anesthesia Type: Gen Natural Airway  Intra-op Monitoring Plan: Standard ASA Monitors  Post Op Pain Control Plan: multimodal analgesia  Induction:  IV  Informed Consent: Informed consent signed with the Patient and all parties understand the risks and agree with anesthesia plan.  All questions answered.   ASA Score: 2  Day of Surgery Review of History & Physical: H&P Update referred to the surgeon/provider.    Ready For Surgery From Anesthesia Perspective.     .

## 2023-03-25 ENCOUNTER — HOSPITAL ENCOUNTER (OUTPATIENT)
Facility: HOSPITAL | Age: 48
Discharge: HOME | End: 2023-03-25
Attending: STUDENT IN AN ORGANIZED HEALTH CARE EDUCATION/TRAINING PROGRAM | Admitting: STUDENT IN AN ORGANIZED HEALTH CARE EDUCATION/TRAINING PROGRAM
Payer: COMMERCIAL

## 2023-03-25 ENCOUNTER — ANESTHESIA (OUTPATIENT)
Dept: ENDOSCOPY | Facility: HOSPITAL | Age: 48
End: 2023-03-25
Payer: COMMERCIAL

## 2023-03-25 VITALS
BODY MASS INDEX: 25.58 KG/M2 | OXYGEN SATURATION: 98 % | WEIGHT: 163 LBS | SYSTOLIC BLOOD PRESSURE: 117 MMHG | TEMPERATURE: 99 F | DIASTOLIC BLOOD PRESSURE: 67 MMHG | HEART RATE: 67 BPM | HEIGHT: 67 IN | RESPIRATION RATE: 14 BRPM

## 2023-03-25 DIAGNOSIS — Z12.11 ENCOUNTER FOR SCREENING COLONOSCOPY: ICD-10-CM

## 2023-03-25 DIAGNOSIS — Z12.11 SCREENING FOR COLON CANCER: Primary | ICD-10-CM

## 2023-03-25 LAB
B-HCG UR QL: NEGATIVE
CTP QC/QA: YES

## 2023-03-25 PROCEDURE — 45380 COLONOSCOPY AND BIOPSY: CPT | Mod: 33,,, | Performed by: STUDENT IN AN ORGANIZED HEALTH CARE EDUCATION/TRAINING PROGRAM

## 2023-03-25 PROCEDURE — 63600175 PHARM REV CODE 636 W HCPCS: Performed by: NURSE ANESTHETIST, CERTIFIED REGISTERED

## 2023-03-25 PROCEDURE — E9220 PRA ENDO ANESTHESIA: ICD-10-PCS | Mod: 33,CRNA,, | Performed by: NURSE ANESTHETIST, CERTIFIED REGISTERED

## 2023-03-25 PROCEDURE — 25000003 PHARM REV CODE 250: Performed by: NURSE ANESTHETIST, CERTIFIED REGISTERED

## 2023-03-25 PROCEDURE — 37000008 HC ANESTHESIA 1ST 15 MINUTES: Performed by: STUDENT IN AN ORGANIZED HEALTH CARE EDUCATION/TRAINING PROGRAM

## 2023-03-25 PROCEDURE — E9220 PRA ENDO ANESTHESIA: HCPCS | Mod: 33,ANES,, | Performed by: ANESTHESIOLOGY

## 2023-03-25 PROCEDURE — E9220 PRA ENDO ANESTHESIA: HCPCS | Mod: 33,CRNA,, | Performed by: NURSE ANESTHETIST, CERTIFIED REGISTERED

## 2023-03-25 PROCEDURE — 45380 COLONOSCOPY AND BIOPSY: CPT | Mod: PT | Performed by: STUDENT IN AN ORGANIZED HEALTH CARE EDUCATION/TRAINING PROGRAM

## 2023-03-25 PROCEDURE — 88305 TISSUE EXAM BY PATHOLOGIST: CPT | Performed by: PATHOLOGY

## 2023-03-25 PROCEDURE — 25000003 PHARM REV CODE 250: Performed by: STUDENT IN AN ORGANIZED HEALTH CARE EDUCATION/TRAINING PROGRAM

## 2023-03-25 PROCEDURE — 45380 PR COLONOSCOPY,BIOPSY: ICD-10-PCS | Mod: 33,,, | Performed by: STUDENT IN AN ORGANIZED HEALTH CARE EDUCATION/TRAINING PROGRAM

## 2023-03-25 PROCEDURE — 88305 TISSUE EXAM BY PATHOLOGIST: CPT | Mod: 26,,, | Performed by: PATHOLOGY

## 2023-03-25 PROCEDURE — E9220 PRA ENDO ANESTHESIA: ICD-10-PCS | Mod: 33,ANES,, | Performed by: ANESTHESIOLOGY

## 2023-03-25 PROCEDURE — 81025 URINE PREGNANCY TEST: CPT | Performed by: STUDENT IN AN ORGANIZED HEALTH CARE EDUCATION/TRAINING PROGRAM

## 2023-03-25 PROCEDURE — 88305 TISSUE EXAM BY PATHOLOGIST: ICD-10-PCS | Mod: 26,,, | Performed by: PATHOLOGY

## 2023-03-25 PROCEDURE — 37000009 HC ANESTHESIA EA ADD 15 MINS: Performed by: STUDENT IN AN ORGANIZED HEALTH CARE EDUCATION/TRAINING PROGRAM

## 2023-03-25 RX ORDER — SODIUM CHLORIDE 9 MG/ML
INJECTION, SOLUTION INTRAVENOUS CONTINUOUS
Status: DISCONTINUED | OUTPATIENT
Start: 2023-03-25 | End: 2023-03-25 | Stop reason: HOSPADM

## 2023-03-25 RX ORDER — ONDANSETRON 2 MG/ML
4 INJECTION INTRAMUSCULAR; INTRAVENOUS DAILY PRN
Status: CANCELLED | OUTPATIENT
Start: 2023-03-25

## 2023-03-25 RX ORDER — LIDOCAINE HYDROCHLORIDE 20 MG/ML
INJECTION INTRAVENOUS
Status: DISCONTINUED | OUTPATIENT
Start: 2023-03-25 | End: 2023-03-25

## 2023-03-25 RX ORDER — PROPOFOL 10 MG/ML
VIAL (ML) INTRAVENOUS CONTINUOUS PRN
Status: DISCONTINUED | OUTPATIENT
Start: 2023-03-25 | End: 2023-03-25

## 2023-03-25 RX ORDER — PROPOFOL 10 MG/ML
VIAL (ML) INTRAVENOUS
Status: DISCONTINUED | OUTPATIENT
Start: 2023-03-25 | End: 2023-03-25

## 2023-03-25 RX ADMIN — PROPOFOL 150 MCG/KG/MIN: 10 INJECTION, EMULSION INTRAVENOUS at 08:03

## 2023-03-25 RX ADMIN — PROPOFOL 80 MG: 10 INJECTION, EMULSION INTRAVENOUS at 08:03

## 2023-03-25 RX ADMIN — LIDOCAINE HYDROCHLORIDE 40 MG: 20 INJECTION INTRAVENOUS at 08:03

## 2023-03-25 RX ADMIN — SODIUM CHLORIDE: 9 INJECTION, SOLUTION INTRAVENOUS at 07:03

## 2023-03-25 NOTE — ANESTHESIA POSTPROCEDURE EVALUATION
Anesthesia Post Evaluation    Patient: Sunita Gan    Procedure(s) Performed: Procedure(s) (LRB):  COLONOSCOPY (N/A)    Final Anesthesia Type: general      Patient location during evaluation: PACU  Patient participation: Yes- Able to Participate  Level of consciousness: awake and alert  Post-procedure vital signs: reviewed and stable  Pain management: adequate  Airway patency: patent    PONV status at discharge: No PONV  Anesthetic complications: no      Cardiovascular status: blood pressure returned to baseline  Respiratory status: room air  Hydration status: euvolemic  Follow-up not needed.          Vitals Value Taken Time   /67 03/25/23 0854   Temp 37.2 °C (98.9 °F) 03/25/23 0832   Pulse 67 03/25/23 0854   Resp 14 03/25/23 0854   SpO2 98 % 03/25/23 0854         Event Time   Out of Recovery 09:03:18         Pain/Flavio Score: Flavio Score: 10 (3/25/2023  8:32 AM)

## 2023-03-25 NOTE — TRANSFER OF CARE
"Anesthesia Transfer of Care Note    Patient: Sunita Gan    Procedure(s) Performed: Procedure(s) (LRB):  COLONOSCOPY (N/A)    Patient location: GI    Anesthesia Type: general    Transport from OR: Transported from OR on room air with adequate spontaneous ventilation    Post pain: adequate analgesia    Post assessment: no apparent anesthetic complications and tolerated procedure well    Post vital signs: stable    Level of consciousness: responds to stimulation and sedated    Nausea/Vomiting: no nausea/vomiting    Complications: none    Transfer of care protocol was followed      Last vitals:   Visit Vitals  /67 (BP Location: Left arm, Patient Position: Lying)   Pulse 73   Temp 36.6 °C (97.9 °F) (Temporal)   Resp 16   Ht 5' 7" (1.702 m)   Wt 73.9 kg (163 lb)   LMP 03/05/2023 (Approximate)   SpO2 99%   Breastfeeding No   BMI 25.53 kg/m²     "

## 2023-03-25 NOTE — H&P
[facility]  Colon and Rectal Surgery    [address]  [place], [place]  Tel: [phone]  Fax: [phone]  [email or web address]   MD Vinny Dennison MD Brian Kann, MD W. Forrest Johnston, MD Matthew Giglia, MD Jennifer Paruch, MD William Kethman, MD Danielle Kay, MD     Patient name: Sunita Gan   YOB: 1975   MRN: [id number]  Date of procedure: 03/25/2023    Procedure: Colonoscopy  Indications: Screening for colon cancer and No family history of colorectal cancer    No history of colonoscopy    The patient was informed of the availability of a certified  without charge. A certified  was not necessary for this visit.    Sedation plan: MAC  ASA: ASA 2 - Patient with mild systemic disease with no functional limitations    Review of Systems  See above    Past Medical History:   Diagnosis Date    Allergy     Miscarriage      Past Surgical History:   Procedure Laterality Date    CHOLECYSTECTOMY       Family History   Problem Relation Age of Onset    Myelodysplastic syndrome Father         55 when passed away    Coronary artery disease Father     Breast cancer Sister         double mastectomy    Cancer Paternal Grandmother         stomach CA     Social History     Tobacco Use    Smoking status: Never    Smokeless tobacco: Never   Substance Use Topics    Alcohol use: Yes     Comment: very rarely    Drug use: Never     Review of patient's allergies indicates:   Allergen Reactions    Avocado (laurus persea) Itching, Shortness Of Breath and Swelling       Prior to Admission medications    Medication Sig Start Date End Date Taking? Authorizing Provider   amoxicillin-clavulanate 875-125mg (AUGMENTIN) 875-125 mg per tablet Take 1 tablet by mouth 2 (two) times daily. for 10 days 3/18/23 3/28/23 Yes Alisson Florez,    co-enzyme Q-10 30 mg capsule Take 30 mg by mouth.   Yes Historical Provider   multivitamin (THERAGRAN) per  "tablet Take 1 tablet by mouth.   Yes Historical Provider   rosuvastatin (CRESTOR) 5 MG tablet Take 1 tablet (5 mg total) by mouth once daily. 7/15/22 7/15/23 Yes Romelia Patel MD   GI cocktail (mylanta 30 mL, lidocaine 2 % viscous 10 mL, dicyclomine 10 mL) 50 mL Take 10 mLs by mouth 3 (three) times daily as needed.  Patient not taking: Reported on 7/15/2022 1/4/20   Kori Robin NP       Physical Examination  /67 (BP Location: Left arm, Patient Position: Lying)   Pulse 73   Temp 97.9 °F (36.6 °C) (Temporal)   Resp 16   Ht 5' 7" (1.702 m)   Wt 73.9 kg (163 lb)   LMP 03/05/2023 (Approximate)   SpO2 99%   Breastfeeding No   BMI 25.53 kg/m²      Constitutional: well developed, no cough, no dyspnea, alert, and no acute distress    Head: Normocephalic, no lesions, without obvious abnormality  Eye: Normal external eye, conjunctiva, and lids, PERRL  Cardiovascular: regular rate and regular rhythm  Respiratory: normal air entry  Gastrointestinal: soft, non-tender, without masses or organomegaly  Neurologic: alert, oriented, normal speech, no focal findings or movement disorder noted  Psychiatric: appropriate, normal mood    Plan of Care    It was a pleasure meeting Ms. Gan today - we will plan to perform a colonoscopy with monitored anesthesia care. The details of the procedure, the possible need for biopsy or polypectomy and the potential risks including bleeding, perforation, missed polyps were discussed in detail and they consented to undergo the procedure.      New Mckeon MD, FACS - Staff Surgeon  Department of Colon & Rectal Surgery  [facility]    "

## 2023-03-25 NOTE — PROVATION PATIENT INSTRUCTIONS
Discharge Summary/Instructions after an Endoscopic Procedure  Patient Name: Sunita Gan  Patient MRN: [id number]  Patient YOB: 1975  Saturday, March 25, 2023  New Mckeon MD  Dear patient,  As a result of recent federal legislation (The Federal Cures Act), you may   receive lab or pathology results from your procedure in your [facility]   account before your physician is able to contact you. Your physician or   their representative will relay the results to you with their   recommendations at their soonest availability.  Thank you,  RESTRICTIONS:  During your procedure today, you received medications for sedation.  These   medications may affect your judgment, balance and coordination.  Therefore,   for 24 hours, you have the following restrictions:   - DO NOT drive a car, operate machinery, make legal/financial decisions,   sign important papers or drink alcohol.    ACTIVITY:  Today: no heavy lifting, straining or running due to procedural   sedation/anesthesia.  The following day: return to full activity including work.  DIET:  Eat and drink normally unless instructed otherwise.     TREATMENT FOR COMMON SIDE EFFECTS:  - Mild abdominal pain, nausea, belching, bloating or excessive gas:  rest,   eat lightly and use a heating pad.  - Sore Throat: treat with throat lozenges and/or gargle with warm salt   water.  - Because air was used during the procedure, expelling large amounts of air   from your rectum or belching is normal.  - If a bowel prep was taken, you may not have a bowel movement for 1-3 days.    This is normal.  SYMPTOMS TO WATCH FOR AND REPORT TO YOUR PHYSICIAN:  1. Abdominal pain or bloating, other than gas cramps.  2. Chest pain.  3. Back pain.  4. Signs of infection such as: chills or fever occurring within 24 hours   after the procedure.  5. Rectal bleeding, which would show as bright red, maroon, or black stools.   (A tablespoon of blood from the rectum is not serious, especially  if   hemorrhoids are present.)  6. Vomiting.  7. Weakness or dizziness.  GO DIRECTLY TO THE NEAREST EMERGENCY ROOM IF YOU HAVE ANY OF THE FOLLOWING:      Difficulty breathing              Chills and/or fever over 101 F   Persistent vomiting and/or vomiting blood   Severe abdominal pain   Severe chest pain   Black, tarry stools   Bleeding- more than one tablespoon   Any other symptom or condition that you feel may need urgent attention  Your doctor recommends these additional instructions:  If any biopsies were taken, your doctors clinic will contact you in 1 to 2   weeks with any results.  - Discharge patient to home.   - Resume previous diet.   - Continue present medications.   - Await pathology results.   - Repeat colonoscopy for surveillance based on pathology results.   - Return to referring physician as previously scheduled.  For questions, problems or results please call your physician - New Mckeon MD at Work:  [phone].  [facility] EMERGENCY ROOM PHONE NUMBER: [phone]  IF A COMPLICATION OR EMERGENCY SITUATION ARISES AND YOU ARE UNABLE TO REACH   YOUR PHYSICIAN - GO DIRECTLY TO THE EMERGENCY ROOM.  MD New Esparza MD  3/25/2023 8:27:57 AM  This report has been verified and signed electronically.  Dear patient,  As a result of recent federal legislation (The Federal Cures Act), you may   receive lab or pathology results from your procedure in your [facility]   account before your physician is able to contact you. Your physician or   their representative will relay the results to you with their   recommendations at their soonest availability.  Thank you,  PROVATION

## 2023-03-31 LAB
FINAL PATHOLOGIC DIAGNOSIS: NORMAL
GROSS: NORMAL
Lab: NORMAL

## 2023-04-05 ENCOUNTER — TELEPHONE (OUTPATIENT)
Dept: OBSTETRICS AND GYNECOLOGY | Facility: CLINIC | Age: 48
End: 2023-04-05
Payer: COMMERCIAL

## 2023-04-05 NOTE — TELEPHONE ENCOUNTER
Patient called stated her cycle has started and patient was scheduled for a colposcopy tomorrow. Orders given to reschedule per Dr Martinez.

## 2023-05-26 ENCOUNTER — PROCEDURE VISIT (OUTPATIENT)
Dept: OBSTETRICS AND GYNECOLOGY | Facility: CLINIC | Age: 48
End: 2023-05-26
Payer: COMMERCIAL

## 2023-05-26 VITALS
SYSTOLIC BLOOD PRESSURE: 114 MMHG | WEIGHT: 166.25 LBS | BODY MASS INDEX: 26.09 KG/M2 | HEIGHT: 67 IN | DIASTOLIC BLOOD PRESSURE: 82 MMHG

## 2023-05-26 DIAGNOSIS — Z76.89 ENCOUNTER FOR BIOPSY: ICD-10-CM

## 2023-05-26 DIAGNOSIS — B97.7 HIGH RISK HPV INFECTION: Primary | ICD-10-CM

## 2023-05-26 LAB
B-HCG UR QL: NEGATIVE
CTP QC/QA: YES

## 2023-05-26 PROCEDURE — 81025 URINE PREGNANCY TEST: CPT | Mod: 59,S$GLB,, | Performed by: OBSTETRICS & GYNECOLOGY

## 2023-05-26 PROCEDURE — 57456 COLPOSCOPY: ICD-10-PCS | Mod: S$GLB,,, | Performed by: OBSTETRICS & GYNECOLOGY

## 2023-05-26 PROCEDURE — 88305 TISSUE EXAM BY PATHOLOGIST: CPT | Mod: 26,,, | Performed by: PATHOLOGY

## 2023-05-26 PROCEDURE — 57456 ENDOCERV CURETTAGE W/SCOPE: CPT | Mod: S$GLB,,, | Performed by: OBSTETRICS & GYNECOLOGY

## 2023-05-26 PROCEDURE — 88305 TISSUE EXAM BY PATHOLOGIST: CPT | Performed by: PATHOLOGY

## 2023-05-26 PROCEDURE — 81025 POCT URINE PREGNANCY: ICD-10-PCS | Mod: 59,S$GLB,, | Performed by: OBSTETRICS & GYNECOLOGY

## 2023-05-26 PROCEDURE — 88305 TISSUE EXAM BY PATHOLOGIST: ICD-10-PCS | Mod: 26,,, | Performed by: PATHOLOGY

## 2023-05-26 NOTE — PROCEDURES
"Colposcopy    Date/Time: 2023 9:45 AM  Performed by: Meredith Martinez MD  Authorized by: Meredith Martinez MD     Consent Done?:  Yes (Written)  Timeout:Immediately prior to procedure a time out was called to verify the correct patient, procedure, equipment, support staff and site/side marked as required    Colposcopy Site:  Cervix  Position:  Supine  Acrowhite Lesion: No    Atypical Vessels: No    Transformation Zone Adequate?: Yes    Biopsy?: No    ECC Performed?: Yes    LEEP Performed?: No    Estimated blood loss (cc):  0   Patient tolerated the procedure well with no immediate complications.   Post-operative instructions were provided for the patient.    Colposcopy:    Sunita Gan is a 48 y.o. female   presents for colposcopy.  Patient's last menstrual period was 2023..  Her most recent pap smear shows NILM/HPV 18+. She has a history of traumatic colposcopy with her previous provider and is tearful and nervous. She declines to reschedule with a valium/. She requests to be numbed with lidocaine if biopsy required.     The abnormal test results were discussed.  We also discussed HPV infection and its association with abnormal Pap tests and cervical cancer.  The risks and benefits of colposcopy were reviewed.  She agrees to proceed.      UPT is negative    Vitals:  Vitals:    23 0944   BP: 114/82   Weight: 75.4 kg (166 lb 3.6 oz)   Height: 5' 7" (1.702 m)   PainSc: 0-No pain     Body mass index is 26.03 kg/m².    Colposcopy Exam:   TIME OUT PERFORMED.     General Assessment:  Cervix fully visualized: Yes  Squamocolumnar junction fully visualized: Yes    Abnormal colposcopic findings:   Lesion(s)  present: No    Colposcopic Impression:   No dysplasia identified    ECC: was performed   The patient tolerated the procedure well.  All collected specimens sent to pathology for histologic analysis.    Assessment and Plan:  High risk HPV infection  -     POCT urine pregnancy  -     " Specimen to Pathology, Ob/Gyn    Encounter for biopsy  -     POCT urine pregnancy    Other orders  -     Colposcopy      Post-colposcopy counseling:  For cramping take NSAIDs or Tylenol.    The importance of keeping follow-up appointments was discussed.  Final plan and follow-up will be based on colposcopy results.  The patient will be notified by phone or via the patient portal with results.      Meredith Martinez MD  Department of Obstetrics & Gynecology  [facility]

## 2023-05-31 LAB
FINAL PATHOLOGIC DIAGNOSIS: NORMAL
GROSS: NORMAL
Lab: NORMAL

## 2023-07-23 DIAGNOSIS — E78.5 HYPERLIPIDEMIA, UNSPECIFIED HYPERLIPIDEMIA TYPE: ICD-10-CM

## 2023-07-23 RX ORDER — ROSUVASTATIN CALCIUM 5 MG/1
TABLET, COATED ORAL
Qty: 90 TABLET | Refills: 0 | Status: SHIPPED | OUTPATIENT
Start: 2023-07-23 | End: 2023-10-21

## 2023-07-23 NOTE — TELEPHONE ENCOUNTER
Refill Decision Note   Sunita Gan  is requesting a refill authorization.  Brief Assessment and Rationale for Refill:  Approve     Medication Therapy Plan:       Medication Reconciliation Completed: No   Comments:     No Care Gaps recommended.     Note composed:10:01 AM 07/23/2023

## 2023-07-26 ENCOUNTER — OFFICE VISIT (OUTPATIENT)
Dept: FAMILY MEDICINE | Facility: CLINIC | Age: 48
End: 2023-07-26
Payer: COMMERCIAL

## 2023-07-26 ENCOUNTER — LAB VISIT (OUTPATIENT)
Dept: LAB | Facility: HOSPITAL | Age: 48
End: 2023-07-26
Attending: INTERNAL MEDICINE
Payer: COMMERCIAL

## 2023-07-26 VITALS
BODY MASS INDEX: 25.99 KG/M2 | SYSTOLIC BLOOD PRESSURE: 128 MMHG | HEART RATE: 62 BPM | RESPIRATION RATE: 16 BRPM | HEIGHT: 67 IN | OXYGEN SATURATION: 98 % | WEIGHT: 165.56 LBS | DIASTOLIC BLOOD PRESSURE: 72 MMHG | TEMPERATURE: 98 F

## 2023-07-26 DIAGNOSIS — N92.1 METRORRHAGIA: ICD-10-CM

## 2023-07-26 DIAGNOSIS — E78.5 HYPERLIPIDEMIA, UNSPECIFIED HYPERLIPIDEMIA TYPE: ICD-10-CM

## 2023-07-26 DIAGNOSIS — Z00.00 ENCOUNTER FOR ANNUAL PHYSICAL EXAM: ICD-10-CM

## 2023-07-26 DIAGNOSIS — Z00.00 ENCOUNTER FOR ANNUAL PHYSICAL EXAM: Primary | ICD-10-CM

## 2023-07-26 LAB
ALBUMIN SERPL BCP-MCNC: 4.2 G/DL (ref 3.5–5.2)
ALP SERPL-CCNC: 39 U/L (ref 55–135)
ALT SERPL W/O P-5'-P-CCNC: 22 U/L (ref 10–44)
ANION GAP SERPL CALC-SCNC: 13 MMOL/L (ref 8–16)
AST SERPL-CCNC: 21 U/L (ref 10–40)
BASOPHILS # BLD AUTO: 0.03 K/UL (ref 0–0.2)
BASOPHILS NFR BLD: 0.5 % (ref 0–1.9)
BILIRUB SERPL-MCNC: 2.1 MG/DL (ref 0.1–1)
BUN SERPL-MCNC: 18 MG/DL (ref 6–20)
CALCIUM SERPL-MCNC: 9.8 MG/DL (ref 8.7–10.5)
CHLORIDE SERPL-SCNC: 104 MMOL/L (ref 95–110)
CHOLEST SERPL-MCNC: 156 MG/DL (ref 120–199)
CHOLEST/HDLC SERPL: 2.4 {RATIO} (ref 2–5)
CO2 SERPL-SCNC: 22 MMOL/L (ref 23–29)
CREAT SERPL-MCNC: 0.8 MG/DL (ref 0.5–1.4)
DIFFERENTIAL METHOD: NORMAL
EOSINOPHIL # BLD AUTO: 0.2 K/UL (ref 0–0.5)
EOSINOPHIL NFR BLD: 2.9 % (ref 0–8)
ERYTHROCYTE [DISTWIDTH] IN BLOOD BY AUTOMATED COUNT: 12.4 % (ref 11.5–14.5)
EST. GFR  (NO RACE VARIABLE): >60 ML/MIN/1.73 M^2
ESTIMATED AVG GLUCOSE: 88 MG/DL (ref 68–131)
FERRITIN SERPL-MCNC: 18 NG/ML (ref 20–300)
GLUCOSE SERPL-MCNC: 88 MG/DL (ref 70–110)
HBA1C MFR BLD: 4.7 % (ref 4–5.6)
HCT VFR BLD AUTO: 40.2 % (ref 37–48.5)
HDLC SERPL-MCNC: 65 MG/DL (ref 40–75)
HDLC SERPL: 41.7 % (ref 20–50)
HGB BLD-MCNC: 13.4 G/DL (ref 12–16)
IMM GRANULOCYTES # BLD AUTO: 0.01 K/UL (ref 0–0.04)
IMM GRANULOCYTES NFR BLD AUTO: 0.2 % (ref 0–0.5)
IRON SERPL-MCNC: 144 UG/DL (ref 30–160)
LDLC SERPL CALC-MCNC: 77.4 MG/DL (ref 63–159)
LYMPHOCYTES # BLD AUTO: 1.6 K/UL (ref 1–4.8)
LYMPHOCYTES NFR BLD: 29.3 % (ref 18–48)
MCH RBC QN AUTO: 30 PG (ref 27–31)
MCHC RBC AUTO-ENTMCNC: 33.3 G/DL (ref 32–36)
MCV RBC AUTO: 90 FL (ref 82–98)
MONOCYTES # BLD AUTO: 0.6 K/UL (ref 0.3–1)
MONOCYTES NFR BLD: 9.8 % (ref 4–15)
NEUTROPHILS # BLD AUTO: 3.2 K/UL (ref 1.8–7.7)
NEUTROPHILS NFR BLD: 57.3 % (ref 38–73)
NONHDLC SERPL-MCNC: 91 MG/DL
NRBC BLD-RTO: 0 /100 WBC
PLATELET # BLD AUTO: 172 K/UL (ref 150–450)
PMV BLD AUTO: 11.8 FL (ref 9.2–12.9)
POTASSIUM SERPL-SCNC: 4.1 MMOL/L (ref 3.5–5.1)
PROT SERPL-MCNC: 7.1 G/DL (ref 6–8.4)
RBC # BLD AUTO: 4.46 M/UL (ref 4–5.4)
SATURATED IRON: 35 % (ref 20–50)
SODIUM SERPL-SCNC: 139 MMOL/L (ref 136–145)
TOTAL IRON BINDING CAPACITY: 407 UG/DL (ref 250–450)
TRANSFERRIN SERPL-MCNC: 275 MG/DL (ref 200–375)
TRIGL SERPL-MCNC: 68 MG/DL (ref 30–150)
TSH SERPL DL<=0.005 MIU/L-ACNC: 2.26 UIU/ML (ref 0.4–4)
WBC # BLD AUTO: 5.59 K/UL (ref 3.9–12.7)

## 2023-07-26 PROCEDURE — 99396 PREV VISIT EST AGE 40-64: CPT | Mod: S$GLB,,, | Performed by: INTERNAL MEDICINE

## 2023-07-26 PROCEDURE — 3044F HG A1C LEVEL LT 7.0%: CPT | Mod: CPTII,S$GLB,, | Performed by: INTERNAL MEDICINE

## 2023-07-26 PROCEDURE — 84466 ASSAY OF TRANSFERRIN: CPT | Performed by: INTERNAL MEDICINE

## 2023-07-26 PROCEDURE — 36415 COLL VENOUS BLD VENIPUNCTURE: CPT | Mod: PO | Performed by: INTERNAL MEDICINE

## 2023-07-26 PROCEDURE — 1159F MED LIST DOCD IN RCRD: CPT | Mod: CPTII,S$GLB,, | Performed by: INTERNAL MEDICINE

## 2023-07-26 PROCEDURE — 1160F RVW MEDS BY RX/DR IN RCRD: CPT | Mod: CPTII,S$GLB,, | Performed by: INTERNAL MEDICINE

## 2023-07-26 PROCEDURE — 3074F PR MOST RECENT SYSTOLIC BLOOD PRESSURE < 130 MM HG: ICD-10-PCS | Mod: CPTII,S$GLB,, | Performed by: INTERNAL MEDICINE

## 2023-07-26 PROCEDURE — 99396 PR PREVENTIVE VISIT,EST,40-64: ICD-10-PCS | Mod: S$GLB,,, | Performed by: INTERNAL MEDICINE

## 2023-07-26 PROCEDURE — 3008F PR BODY MASS INDEX (BMI) DOCUMENTED: ICD-10-PCS | Mod: CPTII,S$GLB,, | Performed by: INTERNAL MEDICINE

## 2023-07-26 PROCEDURE — 3078F PR MOST RECENT DIASTOLIC BLOOD PRESSURE < 80 MM HG: ICD-10-PCS | Mod: CPTII,S$GLB,, | Performed by: INTERNAL MEDICINE

## 2023-07-26 PROCEDURE — 3008F BODY MASS INDEX DOCD: CPT | Mod: CPTII,S$GLB,, | Performed by: INTERNAL MEDICINE

## 2023-07-26 PROCEDURE — 83036 HEMOGLOBIN GLYCOSYLATED A1C: CPT | Performed by: INTERNAL MEDICINE

## 2023-07-26 PROCEDURE — 3044F PR MOST RECENT HEMOGLOBIN A1C LEVEL <7.0%: ICD-10-PCS | Mod: CPTII,S$GLB,, | Performed by: INTERNAL MEDICINE

## 2023-07-26 PROCEDURE — 1160F PR REVIEW ALL MEDS BY PRESCRIBER/CLIN PHARMACIST DOCUMENTED: ICD-10-PCS | Mod: CPTII,S$GLB,, | Performed by: INTERNAL MEDICINE

## 2023-07-26 PROCEDURE — 84443 ASSAY THYROID STIM HORMONE: CPT | Performed by: INTERNAL MEDICINE

## 2023-07-26 PROCEDURE — 3078F DIAST BP <80 MM HG: CPT | Mod: CPTII,S$GLB,, | Performed by: INTERNAL MEDICINE

## 2023-07-26 PROCEDURE — 1159F PR MEDICATION LIST DOCUMENTED IN MEDICAL RECORD: ICD-10-PCS | Mod: CPTII,S$GLB,, | Performed by: INTERNAL MEDICINE

## 2023-07-26 PROCEDURE — 99999 PR PBB SHADOW E&M-EST. PATIENT-LVL III: CPT | Mod: PBBFAC,,, | Performed by: INTERNAL MEDICINE

## 2023-07-26 PROCEDURE — 80061 LIPID PANEL: CPT | Performed by: INTERNAL MEDICINE

## 2023-07-26 PROCEDURE — 82728 ASSAY OF FERRITIN: CPT | Performed by: INTERNAL MEDICINE

## 2023-07-26 PROCEDURE — 85025 COMPLETE CBC W/AUTO DIFF WBC: CPT | Performed by: INTERNAL MEDICINE

## 2023-07-26 PROCEDURE — 83540 ASSAY OF IRON: CPT | Performed by: INTERNAL MEDICINE

## 2023-07-26 PROCEDURE — 99999 PR PBB SHADOW E&M-EST. PATIENT-LVL III: ICD-10-PCS | Mod: PBBFAC,,, | Performed by: INTERNAL MEDICINE

## 2023-07-26 PROCEDURE — 3074F SYST BP LT 130 MM HG: CPT | Mod: CPTII,S$GLB,, | Performed by: INTERNAL MEDICINE

## 2023-07-26 PROCEDURE — 80053 COMPREHEN METABOLIC PANEL: CPT | Performed by: INTERNAL MEDICINE

## 2023-07-26 NOTE — PROGRESS NOTES
Subjective:       Patient ID: Sunita Gan is a pleasant 48 y.o. White female patient    Chief Complaint: Annual Exam      Patient is a pt I saw last on 07/15/2022, see my last notes.    HPI     Patient with past medical history as per list of problems below coming today for an annual physical.  She reports feeling globally fine, she is no specific concerns today.  She would like to be sure that her cholesterol is in the range.    Patient Active Problem List   Diagnosis    BMI 29.0-29.9,adult    Hyperlipidemia    Family history of sudden cardiac death    Gastroesophageal reflux disease    Metrorrhagia    Hypercholesterolemia    High risk HPV infection          ACTIVE MEDICAL ISSUES:  Documented in Problem List     PAST MEDICAL HISTORY  Documented     PAST SURGICAL HISTORY:  Documented     SOCIAL HISTORY:  Documented     FAMILY HISTORY:  Documented     ALLERGIES AND MEDICATIONS: updated and reviewed.  Documented    Review of Systems   Constitutional:  Negative for activity change and unexpected weight change.   HENT:  Negative for hearing loss, rhinorrhea and trouble swallowing.    Eyes:  Negative for discharge and visual disturbance.   Respiratory:  Negative for chest tightness and wheezing.    Cardiovascular:  Negative for chest pain and palpitations.   Gastrointestinal:  Negative for blood in stool, constipation, diarrhea and vomiting.   Endocrine: Negative for polydipsia and polyuria.   Genitourinary:  Negative for difficulty urinating, dysuria, hematuria and menstrual problem.   Musculoskeletal:  Negative for arthralgias, joint swelling and neck pain.   Neurological:  Negative for weakness and headaches.   Psychiatric/Behavioral:  Negative for dysphoric mood.      Objective:      Physical Exam  Vitals and nursing note reviewed.   Constitutional:       Appearance: Normal appearance. She is well-developed.   HENT:      Right Ear: Tympanic membrane and external ear normal.      Left Ear: Tympanic membrane and  "external ear normal.   Eyes:      Conjunctiva/sclera: Conjunctivae normal.   Neck:      Thyroid: No thyromegaly.   Cardiovascular:      Rate and Rhythm: Normal rate and regular rhythm.      Pulses: Normal pulses.      Heart sounds: Normal heart sounds.   Pulmonary:      Effort: Pulmonary effort is normal. No respiratory distress.      Breath sounds: Normal breath sounds. No wheezing.   Abdominal:      General: Bowel sounds are normal.      Palpations: Abdomen is soft. There is no mass.      Tenderness: There is no abdominal tenderness.   Musculoskeletal:         General: Normal range of motion.      Cervical back: Normal range of motion and neck supple.   Lymphadenopathy:      Cervical: No cervical adenopathy.   Skin:     General: Skin is warm and dry.   Neurological:      Mental Status: She is alert and oriented to person, place, and time. Mental status is at baseline.   Psychiatric:         Mood and Affect: Mood normal.         Behavior: Behavior normal.         Thought Content: Thought content normal.         Judgment: Judgment normal.       Vitals:    07/26/23 1055   BP: 128/72   BP Location: Right arm   Patient Position: Sitting   BP Method: Large (Manual)   Pulse: 62   Resp: 16   Temp: 97.9 °F (36.6 °C)   TempSrc: Oral   SpO2: 98%   Weight: 75.1 kg (165 lb 9.1 oz)   Height: 5' 7" (1.702 m)     Body mass index is 25.93 kg/m².    RESULTS: Reviewed labs from last 12 months    Last Lab Results:     Lab Results   Component Value Date    WBC 5.59 07/26/2023    HGB 13.4 07/26/2023    HCT 40.2 07/26/2023     07/26/2023     07/26/2023    K 4.1 07/26/2023     07/26/2023    CO2 22 (L) 07/26/2023    BUN 18 07/26/2023    CREATININE 0.8 07/26/2023    CALCIUM 9.8 07/26/2023    ALBUMIN 4.2 07/26/2023    AST 21 07/26/2023    ALT 22 07/26/2023    CHOL 156 07/26/2023    TRIG 68 07/26/2023    HDL 65 07/26/2023    LDLCALC 77.4 07/26/2023    HGBA1C 4.7 07/26/2023    TSH 2.130 10/24/2022           Assessment:     "   1. Encounter for annual physical exam    2. BMI 25.0-25.9,adult    3. Hyperlipidemia, unspecified hyperlipidemia type    4. Metrorrhagia        Plan:   Sunita was seen today for annual exam.    Diagnoses and all orders for this visit:    Encounter for annual physical exam  -     CBC Auto Differential; Future  -     Comprehensive Metabolic Panel; Future  -     Hemoglobin A1C; Future  -     TSH; Future  -     Lipid Panel; Future    Will do usual blood work, discussed and updated preventative measures.  Discussed lifestyle.      BMI 25.0-25.9,adult    Hyperlipidemia, unspecified hyperlipidemia type    Will monitor.      Metrorrhagia  -     Iron and TIBC; Future  -     Ferritin; Future    Will make sure that the patient is not iron deficient.      No follow-ups on file.    This note was created by combination of typed  and M-Modal dictation.  Transcription errors may be present.  If there are any questions, please contact me.

## 2023-07-30 ENCOUNTER — PATIENT MESSAGE (OUTPATIENT)
Dept: FAMILY MEDICINE | Facility: CLINIC | Age: 48
End: 2023-07-30

## 2023-07-31 ENCOUNTER — TELEPHONE (OUTPATIENT)
Dept: FAMILY MEDICINE | Facility: CLINIC | Age: 48
End: 2023-07-31

## 2023-07-31 DIAGNOSIS — E61.1 IRON DEFICIENCY: Primary | ICD-10-CM

## 2023-07-31 RX ORDER — FERROUS SULFATE 325(65) MG
325 TABLET ORAL
Qty: 90 TABLET | Refills: 0 | Status: SHIPPED | OUTPATIENT
Start: 2023-07-31 | End: 2023-10-31 | Stop reason: SDUPTHER

## 2023-10-16 ENCOUNTER — CLINICAL SUPPORT (OUTPATIENT)
Dept: OTHER | Facility: CLINIC | Age: 48
End: 2023-10-16
Payer: COMMERCIAL

## 2023-10-16 DIAGNOSIS — Z00.8 ENCOUNTER FOR OTHER GENERAL EXAMINATION: ICD-10-CM

## 2023-10-17 VITALS
WEIGHT: 173 LBS | BODY MASS INDEX: 27.15 KG/M2 | SYSTOLIC BLOOD PRESSURE: 130 MMHG | DIASTOLIC BLOOD PRESSURE: 75 MMHG | HEIGHT: 67 IN

## 2023-10-17 LAB
GLUCOSE SERPL-MCNC: 87 MG/DL (ref 60–140)
HDLC SERPL-MCNC: 63 MG/DL
POC CHOLESTEROL, TOTAL: 127 MG/DL
TRIGL SERPL-MCNC: 44 MG/DL

## 2023-10-21 DIAGNOSIS — E78.5 HYPERLIPIDEMIA, UNSPECIFIED HYPERLIPIDEMIA TYPE: ICD-10-CM

## 2023-10-21 RX ORDER — ROSUVASTATIN CALCIUM 5 MG/1
TABLET, COATED ORAL
Qty: 90 TABLET | Refills: 3 | Status: SHIPPED | OUTPATIENT
Start: 2023-10-21

## 2023-10-21 NOTE — TELEPHONE ENCOUNTER
No care due was identified.  [facility] Embedded Care Due Messages. Reference number: [id number].   10/21/2023 3:23:19 AM CDT

## 2023-10-22 NOTE — TELEPHONE ENCOUNTER
Refill Decision Note   Sunita Gan  is requesting a refill authorization.  Brief Assessment and Rationale for Refill:  Approve     Medication Therapy Plan:         Comments:     Note composed:10:08 PM 10/21/2023

## 2023-11-01 DIAGNOSIS — Z12.31 OTHER SCREENING MAMMOGRAM: ICD-10-CM

## 2023-11-06 ENCOUNTER — PATIENT MESSAGE (OUTPATIENT)
Dept: ADMINISTRATIVE | Facility: HOSPITAL | Age: 48
End: 2023-11-06
Payer: COMMERCIAL

## 2023-11-22 ENCOUNTER — HOSPITAL ENCOUNTER (OUTPATIENT)
Dept: RADIOLOGY | Facility: OTHER | Age: 48
Discharge: HOME | End: 2023-11-22
Attending: INTERNAL MEDICINE
Payer: COMMERCIAL

## 2023-11-22 DIAGNOSIS — Z12.31 OTHER SCREENING MAMMOGRAM: ICD-10-CM

## 2023-11-22 PROCEDURE — 77067 SCR MAMMO BI INCL CAD: CPT | Mod: 26,,, | Performed by: RADIOLOGY

## 2023-11-22 PROCEDURE — 77067 MAMMO DIGITAL SCREENING BILAT WITH TOMO: ICD-10-PCS | Mod: 26,,, | Performed by: RADIOLOGY

## 2023-11-22 PROCEDURE — 77063 MAMMO DIGITAL SCREENING BILAT WITH TOMO: ICD-10-PCS | Mod: 26,,, | Performed by: RADIOLOGY

## 2023-11-22 PROCEDURE — 77063 BREAST TOMOSYNTHESIS BI: CPT | Mod: 26,,, | Performed by: RADIOLOGY

## 2023-11-22 PROCEDURE — 77067 SCR MAMMO BI INCL CAD: CPT | Mod: TC

## 2024-01-27 DIAGNOSIS — E61.1 IRON DEFICIENCY: ICD-10-CM

## 2024-01-31 RX ORDER — FERROUS SULFATE TAB 325 MG (65 MG ELEMENTAL FE) 325 (65 FE) MG
TAB ORAL
Qty: 90 TABLET | Refills: 0 | Status: SHIPPED | OUTPATIENT
Start: 2024-01-31

## 2024-05-06 ENCOUNTER — OFFICE VISIT (OUTPATIENT)
Dept: URGENT CARE | Facility: CLINIC | Age: 49
End: 2024-05-06
Payer: COMMERCIAL

## 2024-05-06 VITALS
WEIGHT: 179 LBS | HEART RATE: 79 BPM | SYSTOLIC BLOOD PRESSURE: 121 MMHG | RESPIRATION RATE: 17 BRPM | OXYGEN SATURATION: 98 % | DIASTOLIC BLOOD PRESSURE: 84 MMHG | TEMPERATURE: 100 F | BODY MASS INDEX: 28.09 KG/M2 | HEIGHT: 67 IN

## 2024-05-06 DIAGNOSIS — R05.9 COUGH, UNSPECIFIED TYPE: ICD-10-CM

## 2024-05-06 DIAGNOSIS — J06.9 VIRAL URI WITH COUGH: Primary | ICD-10-CM

## 2024-05-06 LAB
CTP QC/QA: YES
CTP QC/QA: YES
POC MOLECULAR INFLUENZA A AGN: NEGATIVE
POC MOLECULAR INFLUENZA B AGN: NEGATIVE
SARS-COV-2 AG RESP QL IA.RAPID: NEGATIVE

## 2024-05-06 PROCEDURE — 99213 OFFICE O/P EST LOW 20 MIN: CPT | Mod: S$GLB,,, | Performed by: NURSE PRACTITIONER

## 2024-05-06 PROCEDURE — 87811 SARS-COV-2 COVID19 W/OPTIC: CPT | Mod: QW,S$GLB,, | Performed by: NURSE PRACTITIONER

## 2024-05-06 PROCEDURE — 87502 INFLUENZA DNA AMP PROBE: CPT | Mod: QW,S$GLB,, | Performed by: NURSE PRACTITIONER

## 2024-05-06 RX ORDER — BENZONATATE 100 MG/1
100 CAPSULE ORAL EVERY 6 HOURS PRN
Qty: 30 CAPSULE | Refills: 0 | Status: SHIPPED | OUTPATIENT
Start: 2024-05-06 | End: 2025-05-06

## 2024-05-06 NOTE — PROGRESS NOTES
"Subjective:      Patient ID: Sunita Gan is a 49 y.o. female.    Vitals:  height is 5' 7" (1.702 m) and weight is 81.2 kg (179 lb). Her oral temperature is 100 °F (37.8 °C). Her blood pressure is 121/84 and her pulse is 79. Her respiration is 17 and oxygen saturation is 98%.     Chief Complaint: Cough    Patient presents c.o. cough. Symps include headaches and fatigue.Symps began yesterday.    Provider note begins below:    Patient comes to the clinic with a 2 day complaint of cough, headache, fatigue.  No known sick contacts.  No difficulty breathing or shortness of breath.  Patient is immunized against COVID-19.    Cough  The current episode started yesterday. The problem has been unchanged. The problem occurs constantly. The cough is Non-productive. Associated symptoms include headaches. Pertinent negatives include no chest pain, chills, ear congestion, ear pain, fever, heartburn, hemoptysis, myalgias, nasal congestion, rhinorrhea, sore throat, shortness of breath or sweats. Nothing aggravates the symptoms. Treatments tried: night quil. The treatment provided mild relief. Her past medical history is significant for pneumonia. There is no history of asthma, bronchiectasis, bronchitis, COPD, emphysema or environmental allergies.       Constitution: Negative for chills and fever.   HENT:  Negative for ear pain and sore throat.    Cardiovascular:  Negative for chest pain.   Respiratory:  Positive for cough. Negative for bloody sputum and shortness of breath.    Gastrointestinal:  Negative for heartburn.   Musculoskeletal:  Negative for muscle ache.   Allergic/Immunologic: Negative for environmental allergies.   Neurological:  Positive for headaches.      Objective:     Physical Exam   Constitutional: She is oriented to person, place, and time. She appears well-developed. She is cooperative.  Non-toxic appearance. She appears ill. No distress.   HENT:   Head: Normocephalic and atraumatic.   Ears:   Right Ear: " Hearing, external ear and ear canal normal. Tympanic membrane is injected.   Left Ear: Hearing, external ear and ear canal normal. Tympanic membrane is scarred.   Nose: Nose normal. No mucosal edema, rhinorrhea or nasal deformity. No epistaxis. Right sinus exhibits no maxillary sinus tenderness and no frontal sinus tenderness. Left sinus exhibits no maxillary sinus tenderness and no frontal sinus tenderness.   Mouth/Throat: Uvula is midline, oropharynx is clear and moist and mucous membranes are normal. No trismus in the jaw. Normal dentition. No uvula swelling. Cobblestoning present. No oropharyngeal exudate, posterior oropharyngeal edema or posterior oropharyngeal erythema.   Eyes: Conjunctivae and lids are normal. No scleral icterus.   Neck: Trachea normal and phonation normal. Neck supple. No edema present. No erythema present. No neck rigidity present.   Cardiovascular: Normal rate, regular rhythm, normal heart sounds and normal pulses.   Pulmonary/Chest: Effort normal and breath sounds normal. No stridor. No respiratory distress. She has no decreased breath sounds. She has no wheezes. She has no rhonchi. She has no rales.   Abdominal: Normal appearance.   Musculoskeletal: Normal range of motion.         General: No deformity. Normal range of motion.   Neurological: She is alert and oriented to person, place, and time. She exhibits normal muscle tone. Coordination normal.   Skin: Skin is warm, dry, intact, not diaphoretic and not pale.   Psychiatric: Her speech is normal and behavior is normal. Judgment and thought content normal.   Nursing note and vitals reviewed.    Results for orders placed or performed in visit on 05/06/24   POCT Influenza A/B MOLECULAR   Result Value Ref Range    POC Molecular Influenza A Ag Negative Negative    POC Molecular Influenza B Ag Negative Negative     Acceptable Yes    SARS Coronavirus 2 Antigen, POCT Manual Read   Result Value Ref Range    SARS Coronavirus 2  Antigen Negative Negative     Acceptable Yes        Assessment:     1. Viral URI with cough    2. Cough, unspecified type        Plan:     Labs ordered at this visit reviewed.     Viral URI with cough  -     benzonatate (TESSALON PERLES) 100 MG capsule; Take 1 capsule (100 mg total) by mouth every 6 (six) hours as needed for Cough.  Dispense: 30 capsule; Refill: 0    Cough, unspecified type  -     POCT Influenza A/B MOLECULAR  -     SARS Coronavirus 2 Antigen, POCT Manual Read  -     benzonatate (TESSALON PERLES) 100 MG capsule; Take 1 capsule (100 mg total) by mouth every 6 (six) hours as needed for Cough.  Dispense: 30 capsule; Refill: 0

## 2024-06-23 DIAGNOSIS — E61.1 IRON DEFICIENCY: ICD-10-CM

## 2024-06-24 ENCOUNTER — TELEPHONE (OUTPATIENT)
Dept: FAMILY MEDICINE | Facility: CLINIC | Age: 49
End: 2024-06-24
Payer: COMMERCIAL

## 2024-06-24 RX ORDER — FERROUS SULFATE 325(65) MG
TABLET, DELAYED RELEASE (ENTERIC COATED) ORAL
Qty: 90 TABLET | Refills: 0 | Status: SHIPPED | OUTPATIENT
Start: 2024-06-24

## 2024-06-24 NOTE — TELEPHONE ENCOUNTER
----- Message from Romelia Patel MD sent at 6/24/2024 12:03 PM CDT -----  Pt needing to schedule her annual physical. On iron and asked for refills, but will need blood work and visit. Thanks

## 2024-06-25 DIAGNOSIS — E61.1 IRON DEFICIENCY: ICD-10-CM

## 2024-06-25 RX ORDER — FERROUS SULFATE 325(65) MG
TABLET, DELAYED RELEASE (ENTERIC COATED) ORAL
Qty: 90 TABLET | Refills: 0 | Status: CANCELLED | OUTPATIENT
Start: 2024-06-25

## 2024-07-11 ENCOUNTER — OFFICE VISIT (OUTPATIENT)
Dept: FAMILY MEDICINE | Facility: CLINIC | Age: 49
End: 2024-07-11
Payer: COMMERCIAL

## 2024-07-11 VITALS
HEART RATE: 59 BPM | TEMPERATURE: 98 F | BODY MASS INDEX: 29.07 KG/M2 | DIASTOLIC BLOOD PRESSURE: 80 MMHG | RESPIRATION RATE: 16 BRPM | HEIGHT: 67 IN | WEIGHT: 185.19 LBS | SYSTOLIC BLOOD PRESSURE: 124 MMHG | OXYGEN SATURATION: 97 %

## 2024-07-11 DIAGNOSIS — Z00.00 ANNUAL PHYSICAL EXAM: Primary | ICD-10-CM

## 2024-07-11 DIAGNOSIS — E61.1 IRON DEFICIENCY: ICD-10-CM

## 2024-07-11 DIAGNOSIS — N92.1 METRORRHAGIA: ICD-10-CM

## 2024-07-11 DIAGNOSIS — N95.1 PERIMENOPAUSE: ICD-10-CM

## 2024-07-11 PROCEDURE — 3079F DIAST BP 80-89 MM HG: CPT | Mod: CPTII,S$GLB,, | Performed by: INTERNAL MEDICINE

## 2024-07-11 PROCEDURE — 1160F RVW MEDS BY RX/DR IN RCRD: CPT | Mod: CPTII,S$GLB,, | Performed by: INTERNAL MEDICINE

## 2024-07-11 PROCEDURE — 3074F SYST BP LT 130 MM HG: CPT | Mod: CPTII,S$GLB,, | Performed by: INTERNAL MEDICINE

## 2024-07-11 PROCEDURE — 3008F BODY MASS INDEX DOCD: CPT | Mod: CPTII,S$GLB,, | Performed by: INTERNAL MEDICINE

## 2024-07-11 PROCEDURE — 1159F MED LIST DOCD IN RCRD: CPT | Mod: CPTII,S$GLB,, | Performed by: INTERNAL MEDICINE

## 2024-07-11 PROCEDURE — 3044F HG A1C LEVEL LT 7.0%: CPT | Mod: CPTII,S$GLB,, | Performed by: INTERNAL MEDICINE

## 2024-07-11 PROCEDURE — 99396 PREV VISIT EST AGE 40-64: CPT | Mod: S$GLB,,, | Performed by: INTERNAL MEDICINE

## 2024-07-11 PROCEDURE — 99999 PR PBB SHADOW E&M-EST. PATIENT-LVL IV: CPT | Mod: PBBFAC,,, | Performed by: INTERNAL MEDICINE

## 2024-07-11 RX ORDER — FERROUS SULFATE 325(65) MG
325 TABLET, DELAYED RELEASE (ENTERIC COATED) ORAL DAILY
Qty: 90 TABLET | Refills: 3 | Status: SHIPPED | OUTPATIENT
Start: 2024-07-11

## 2024-07-11 NOTE — PROGRESS NOTES
Health Maintenance Due   Topic     TETANUS VACCINE  CONSULT WITH PCP    COVID-19 Vaccine (4 - 2023-24 season) Not offered at this facility.

## 2024-07-16 ENCOUNTER — LAB VISIT (OUTPATIENT)
Dept: LAB | Facility: HOSPITAL | Age: 49
End: 2024-07-16
Attending: INTERNAL MEDICINE
Payer: COMMERCIAL

## 2024-07-16 DIAGNOSIS — Z00.00 ANNUAL PHYSICAL EXAM: ICD-10-CM

## 2024-07-16 DIAGNOSIS — E61.1 IRON DEFICIENCY: ICD-10-CM

## 2024-07-16 LAB
ALBUMIN SERPL BCP-MCNC: 3.8 G/DL (ref 3.5–5.2)
ALP SERPL-CCNC: 32 U/L (ref 55–135)
ALT SERPL W/O P-5'-P-CCNC: 18 U/L (ref 10–44)
ANION GAP SERPL CALC-SCNC: 8 MMOL/L (ref 8–16)
AST SERPL-CCNC: 21 U/L (ref 10–40)
BASOPHILS # BLD AUTO: 0.04 K/UL (ref 0–0.2)
BASOPHILS NFR BLD: 0.9 % (ref 0–1.9)
BILIRUB SERPL-MCNC: 1.5 MG/DL (ref 0.1–1)
BUN SERPL-MCNC: 11 MG/DL (ref 6–20)
CALCIUM SERPL-MCNC: 9.2 MG/DL (ref 8.7–10.5)
CHLORIDE SERPL-SCNC: 110 MMOL/L (ref 95–110)
CHOLEST SERPL-MCNC: 135 MG/DL (ref 120–199)
CHOLEST/HDLC SERPL: 2.2 {RATIO} (ref 2–5)
CO2 SERPL-SCNC: 23 MMOL/L (ref 23–29)
CREAT SERPL-MCNC: 0.9 MG/DL (ref 0.5–1.4)
DIFFERENTIAL METHOD BLD: NORMAL
EOSINOPHIL # BLD AUTO: 0.2 K/UL (ref 0–0.5)
EOSINOPHIL NFR BLD: 5.3 % (ref 0–8)
ERYTHROCYTE [DISTWIDTH] IN BLOOD BY AUTOMATED COUNT: 12.5 % (ref 11.5–14.5)
EST. GFR  (NO RACE VARIABLE): >60 ML/MIN/1.73 M^2
ESTIMATED AVG GLUCOSE: 88 MG/DL (ref 68–131)
FERRITIN SERPL-MCNC: 48 NG/ML (ref 20–300)
GLUCOSE SERPL-MCNC: 91 MG/DL (ref 70–110)
HBA1C MFR BLD: 4.7 % (ref 4–5.6)
HCT VFR BLD AUTO: 38.7 % (ref 37–48.5)
HDLC SERPL-MCNC: 61 MG/DL (ref 40–75)
HDLC SERPL: 45.2 % (ref 20–50)
HGB BLD-MCNC: 12.6 G/DL (ref 12–16)
IMM GRANULOCYTES # BLD AUTO: 0.01 K/UL (ref 0–0.04)
IMM GRANULOCYTES NFR BLD AUTO: 0.2 % (ref 0–0.5)
IRON SERPL-MCNC: 97 UG/DL (ref 30–160)
LDLC SERPL CALC-MCNC: 65.2 MG/DL (ref 63–159)
LYMPHOCYTES # BLD AUTO: 1.3 K/UL (ref 1–4.8)
LYMPHOCYTES NFR BLD: 29.3 % (ref 18–48)
MCH RBC QN AUTO: 30 PG (ref 27–31)
MCHC RBC AUTO-ENTMCNC: 32.6 G/DL (ref 32–36)
MCV RBC AUTO: 92 FL (ref 82–98)
MONOCYTES # BLD AUTO: 0.3 K/UL (ref 0.3–1)
MONOCYTES NFR BLD: 7.4 % (ref 4–15)
NEUTROPHILS # BLD AUTO: 2.5 K/UL (ref 1.8–7.7)
NEUTROPHILS NFR BLD: 56.9 % (ref 38–73)
NONHDLC SERPL-MCNC: 74 MG/DL
NRBC BLD-RTO: 0 /100 WBC
PLATELET # BLD AUTO: 198 K/UL (ref 150–450)
PMV BLD AUTO: 11.7 FL (ref 9.2–12.9)
POTASSIUM SERPL-SCNC: 4.2 MMOL/L (ref 3.5–5.1)
PROT SERPL-MCNC: 6.5 G/DL (ref 6–8.4)
RBC # BLD AUTO: 4.2 M/UL (ref 4–5.4)
SATURATED IRON: 31 % (ref 20–50)
SODIUM SERPL-SCNC: 141 MMOL/L (ref 136–145)
TOTAL IRON BINDING CAPACITY: 311 UG/DL (ref 250–450)
TRANSFERRIN SERPL-MCNC: 210 MG/DL (ref 200–375)
TRIGL SERPL-MCNC: 44 MG/DL (ref 30–150)
TSH SERPL DL<=0.005 MIU/L-ACNC: 2.93 UIU/ML (ref 0.4–4)
WBC # BLD AUTO: 4.33 K/UL (ref 3.9–12.7)

## 2024-07-16 PROCEDURE — 83036 HEMOGLOBIN GLYCOSYLATED A1C: CPT | Performed by: INTERNAL MEDICINE

## 2024-07-16 PROCEDURE — 84443 ASSAY THYROID STIM HORMONE: CPT | Performed by: INTERNAL MEDICINE

## 2024-07-16 PROCEDURE — 80061 LIPID PANEL: CPT | Performed by: INTERNAL MEDICINE

## 2024-07-16 PROCEDURE — 83540 ASSAY OF IRON: CPT | Performed by: INTERNAL MEDICINE

## 2024-07-16 PROCEDURE — 36415 COLL VENOUS BLD VENIPUNCTURE: CPT | Mod: PO | Performed by: INTERNAL MEDICINE

## 2024-07-16 PROCEDURE — 82728 ASSAY OF FERRITIN: CPT | Performed by: INTERNAL MEDICINE

## 2024-07-16 PROCEDURE — 80053 COMPREHEN METABOLIC PANEL: CPT | Performed by: INTERNAL MEDICINE

## 2024-07-16 PROCEDURE — 85025 COMPLETE CBC W/AUTO DIFF WBC: CPT | Performed by: INTERNAL MEDICINE

## 2024-07-17 ENCOUNTER — PATIENT MESSAGE (OUTPATIENT)
Dept: FAMILY MEDICINE | Facility: CLINIC | Age: 49
End: 2024-07-17
Payer: COMMERCIAL

## 2024-07-17 NOTE — PROGRESS NOTES
Subjective:       Patient ID: Sunita Gan is a pleasant 49 y.o. White female patient    Chief Complaint: Annual Exam      Patient is a patient I saw last on July 26, 2023, see my last notes.  It was an annual physical exam.      HPI     Patient with past medical history as per list of problems below coming today for her annual exam.  She denies any specific issue, she tries to good care of her health.  No other issue reported except possible symptoms of perimenopause.  She still has some heavy periods.      Patient Active Problem List   Diagnosis    BMI 29.0-29.9,adult    Hyperlipidemia    Family history of sudden cardiac death    Gastroesophageal reflux disease    Metrorrhagia    Hypercholesterolemia    High risk HPV infection         PAST MEDICAL HISTORY  Past Medical History:   Diagnosis Date    Allergy     Miscarriage         PAST SURGICAL HISTORY:  Past Surgical History:   Procedure Laterality Date    CHOLECYSTECTOMY      COLONOSCOPY N/A 3/25/2023    Procedure: COLONOSCOPY;  Surgeon: New Mckeon MD;  Location: [address];  Service: Endoscopy;  Laterality: N/A;  would like to to it at [facility] via portal - PC  no answer precall -         SOCIAL HISTORY:   reports that she has never smoked. She has never used smokeless tobacco. She reports current alcohol use. She reports that she does not use drugs.     FAMILY HISTORY:  Family History   Problem Relation Name Age of Onset    Myelodysplastic syndrome Father          55 when passed away    Coronary artery disease Father      Breast cancer Sister          double mastectomy    Cancer Paternal Grandmother          stomach CA        ALLERGIES:   Review of patient's allergies indicates:   Allergen Reactions    Avocado (laurus persea) Itching, Shortness Of Breath and Swelling       MEDICATIONS:    Current Outpatient Medications:     multivitamin (THERAGRAN) per tablet, Take 1 tablet by mouth., Disp: , Rfl:     rosuvastatin (CRESTOR)  "5 MG tablet, TAKE 1 TABLET(5 MG) BY MOUTH EVERY DAY, Disp: 90 tablet, Rfl: 3    ferrous sulfate 325 (65 FE) MG EC tablet, Take 1 tablet (325 mg total) by mouth once daily. TAKE 1 TABLET BY MOUTH EVERYDAY WITH BREAKFAST ON AN EMPTY STOMACH WITH ORANGE JUICE, Disp: 90 tablet, Rfl: 3    Review of Systems   Constitutional: Negative.    HENT: Negative.     Eyes: Negative.    Cardiovascular: Negative.    Genitourinary:         Heavy periods   Skin: Negative.    All other systems reviewed and are negative.       Objective:      .Physical Exam  Vitals reviewed.   Constitutional:       Appearance: Normal appearance.   HENT:      Right Ear: Tympanic membrane normal.      Left Ear: Tympanic membrane normal.   Cardiovascular:      Rate and Rhythm: Normal rate and regular rhythm.      Pulses: Normal pulses.      Heart sounds: Normal heart sounds.   Pulmonary:      Effort: Pulmonary effort is normal.      Breath sounds: Normal breath sounds.   Skin:     General: Skin is warm and dry.   Neurological:      Mental Status: She is alert. Mental status is at baseline.   Psychiatric:         Mood and Affect: Mood normal.         Behavior: Behavior normal.         Thought Content: Thought content normal.         Judgment: Judgment normal.          Vitals:    07/11/24 1052   BP: 124/80   BP Location: Left arm   Patient Position: Sitting   BP Method: Large (Manual)   Pulse: (!) 59   Resp: 16   Temp: 98.3 °F (36.8 °C)   TempSrc: Oral   SpO2: 97%   Weight: 84 kg (185 lb 3 oz)   Height: 5' 7" (1.702 m)     Body mass index is 29 kg/m².    RESULTS: Reviewed labs from last 12 months      Last Lab Results:     Lab Results   Component Value Date    WBC 4.33 07/16/2024    HGB 12.6 07/16/2024    HCT 38.7 07/16/2024     07/16/2024     07/16/2024    K 4.2 07/16/2024     07/16/2024    CO2 23 07/16/2024    BUN 11 07/16/2024    CREATININE 0.9 07/16/2024    CALCIUM 9.2 07/16/2024    ALBUMIN 3.8 07/16/2024    AST 21 07/16/2024    ALT 18 " 07/16/2024    CHOL 135 07/16/2024    TRIG 44 07/16/2024    HDL 61 07/16/2024    LDLCALC 65.2 07/16/2024    HGBA1C 4.7 07/16/2024    TSH 2.929 07/16/2024       Assessment & Plan:       1. Annual physical exam  -     CBC Auto Differential; Future  -     Comprehensive Metabolic Panel; Future; Expected date: 07/11/2024  -     Hemoglobin A1C; Future; Expected date: 07/11/2024  -     TSH; Future; Expected date: 07/11/2024  -     Lipid Panel; Future; Expected date: 07/11/2024    Will do usual blood work, discussed and updated preventative measures, discussed lifestyle.    2. Adult BMI 29.0-29.9 kg/sq m    3. Perimenopause  -     Ambulatory referral/consult to Obstetrics / Gynecology; Future; Expected date: 07/18/2024    Referral to OBGYN due to symptoms of perimenopause.    4. Metrorrhagia  -     Ambulatory referral/consult to Obstetrics / Gynecology; Future; Expected date: 07/18/2024    See above     5. Iron deficiency  -     ferrous sulfate 325 (65 FE) MG EC tablet; Take 1 tablet (325 mg total) by mouth once daily. TAKE 1 TABLET BY MOUTH EVERYDAY WITH BREAKFAST ON AN EMPTY STOMACH WITH ORANGE JUICE  Dispense: 90 tablet; Refill: 3  -     Iron and TIBC; Future; Expected date: 07/11/2024  -     Ferritin; Future; Expected date: 07/11/2024    Probably due to above, will check blood work.  Will go on taking iron for now.     No follow-ups on file.    This note was created by combination of typed  and M-Modal dictation.  Transcription errors may be present.  If there are any questions, please contact me.

## 2024-07-19 ENCOUNTER — OFFICE VISIT (OUTPATIENT)
Dept: OBSTETRICS AND GYNECOLOGY | Facility: CLINIC | Age: 49
End: 2024-07-19
Payer: COMMERCIAL

## 2024-07-19 ENCOUNTER — TELEPHONE (OUTPATIENT)
Dept: OBSTETRICS AND GYNECOLOGY | Facility: CLINIC | Age: 49
End: 2024-07-19
Payer: COMMERCIAL

## 2024-07-19 VITALS — WEIGHT: 185.19 LBS | DIASTOLIC BLOOD PRESSURE: 70 MMHG | BODY MASS INDEX: 29 KG/M2 | SYSTOLIC BLOOD PRESSURE: 92 MMHG

## 2024-07-19 DIAGNOSIS — F43.9 STRESS: ICD-10-CM

## 2024-07-19 DIAGNOSIS — Z98.890 HISTORY OF COLPOSCOPY: ICD-10-CM

## 2024-07-19 DIAGNOSIS — Z01.419 WELL WOMAN EXAM WITH ROUTINE GYNECOLOGICAL EXAM: Primary | ICD-10-CM

## 2024-07-19 DIAGNOSIS — N92.0 MENORRHAGIA WITH REGULAR CYCLE: ICD-10-CM

## 2024-07-19 DIAGNOSIS — Z12.4 PAP SMEAR FOR CERVICAL CANCER SCREENING: ICD-10-CM

## 2024-07-19 DIAGNOSIS — Z12.31 VISIT FOR SCREENING MAMMOGRAM: ICD-10-CM

## 2024-07-19 DIAGNOSIS — N95.1 PERIMENOPAUSE: ICD-10-CM

## 2024-07-19 DIAGNOSIS — R45.86 MOOD CHANGES: ICD-10-CM

## 2024-07-19 PROCEDURE — 3074F SYST BP LT 130 MM HG: CPT | Mod: CPTII,S$GLB,, | Performed by: NURSE PRACTITIONER

## 2024-07-19 PROCEDURE — 99396 PREV VISIT EST AGE 40-64: CPT | Mod: 25,S$GLB,, | Performed by: NURSE PRACTITIONER

## 2024-07-19 PROCEDURE — 1159F MED LIST DOCD IN RCRD: CPT | Mod: CPTII,S$GLB,, | Performed by: NURSE PRACTITIONER

## 2024-07-19 PROCEDURE — 3078F DIAST BP <80 MM HG: CPT | Mod: CPTII,S$GLB,, | Performed by: NURSE PRACTITIONER

## 2024-07-19 PROCEDURE — 3008F BODY MASS INDEX DOCD: CPT | Mod: CPTII,S$GLB,, | Performed by: NURSE PRACTITIONER

## 2024-07-19 PROCEDURE — 3044F HG A1C LEVEL LT 7.0%: CPT | Mod: CPTII,S$GLB,, | Performed by: NURSE PRACTITIONER

## 2024-07-19 PROCEDURE — 99999 PR PBB SHADOW E&M-EST. PATIENT-LVL IV: CPT | Mod: PBBFAC,,, | Performed by: NURSE PRACTITIONER

## 2024-07-19 NOTE — PROGRESS NOTES
CC: Annual  HPI: Pt is a 49 y.o.  female who presents for routine annual exam. New to me. She uses no method for contraception. She does not want STD screening. She is not currently sexually active, when she is she uses condoms for contraception.    Heavy periods- always this way since she first started her period. Not interested in hormonal contraception or medication to manage. In the past has considered an ablation but postponed due to moving. Interested in reconsidering this- concerned about insurance coverage so wants to call first. Periods are monthly, bleeds 6-7 days at a time.    History of HPV 18 last year. Colposcopy done with Dr. Martinez and results were negative for dysplasia. She had a bad experience with that biopsy- pain and bedside manner of the provider. Desires to see a different provider if biopsy needed again.     . One adopted daughter age 14 (special needs). Reports several miscarriages in the past. Lives with her mother- this is stressful as she has dementia symptoms and some mental health issues she refuses to address. She has siblings that live locally. She moved back to [facility] about 2 years ago. Feels like her mood has changed in the past 6-12 months. Stressed with home environment, feels like a guest in the home. 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 At the end of every day she feels anger that is alarming to her. Gets irritated easily. It feels chemical or even hormonal to her. She has done therapy in the past- provider she loved passed away. Open to establishing care with someone new. She has never been on medication for anxiety or depression- not opposed to this if it would help. Weight fluctuates. Recent normal TSH on file.     FH:   Breast cancer: sister  Colon cancer: none  Ovarian cancer: none  Uterine cancer: none    HPV vaccine: na  Last pap smear: 2023 nilm hpv pos, colpo negative  History of abnormal pap smears:  yes    Colonoscopy: current, repeat 2033  DEXA: na  Mammogram: due Nov '24  STD history: hpv  Birth control:none  OB history: 1 adopted daughter  Tobacco use: no    ROS:  GENERAL: Feeling well overall. Denies fever or chills.   SKIN: Denies rash or lesions.   HEAD: Denies head injury or headache.   NODES: Denies enlarged lymph nodes.   CHEST: Denies chest pain or shortness of breath.   CARDIOVASCULAR: Denies palpitations or left sided chest pain.   ABDOMEN: No abdominal pain, constipation, diarrhea, nausea, vomiting or rectal bleeding.   URINARY: No dysuria, hematuria, or burning on urination.  REPRODUCTIVE: See HPI.   BREASTS: Denies pain, lumps, or nipple discharge.   HEMATOLOGIC: No easy bruisability or excessive bleeding.   MUSCULOSKELETAL: Denies joint pain or swelling.   NEUROLOGIC: Denies syncope or weakness.   PSYCHIATRIC: Denies depression, pos mood swings, stress    PE:   APPEARANCE: Well nourished, well developed, White female in no acute distress.  NODES: no cervical, supraclavicular, or inguinal lymphadenopathy  BREASTS: Symmetrical, no skin changes or visible lesions. No palpable masses, nipple discharge or adenopathy bilaterally.  ABDOMEN: Soft. No tenderness or masses. No distention. No hernias palpated. No CVA tenderness.  VULVA: No lesions. Normal external female genitalia.  URETHRAL MEATUS: Normal size and location, no lesions, no prolapse.  URETHRA: No masses, tenderness, or prolapse.  VAGINA: Moist. No lesions or lacerations noted. No abnormal discharge present. No odor present.   CERVIX: No lesions or discharge. No cervical motion tenderness.   UTERUS: Normal size, regular shape, mobile, non-tender.  ADNEXA: No tenderness. No fullness or masses palpated in the adnexal regions.   ANUS PERINEUM: Normal.      Diagnosis:  1. Well woman exam with routine gynecological exam    2. Perimenopause    3. Pap smear for cervical cancer screening    4. History of colposcopy    5. Visit for screening  mammogram    6. Menorrhagia with regular cycle    7. Mood changes    8. Stress        Plan:     Orders Placed This Encounter    HPV High Risk Genotypes, PCR    Mammo Digital Screening Bilat w/ Ravin    Ambulatory referral/consult to Psychiatry    Ambulatory referral/consult to Women's Wellness and Survivorship    Ambulatory referral/consult to Psychology    Liquid-Based Pap Smear, Screening     Pap updated  Mammogram ordered, due in November  Referral to wellness clinic to discuss perimenopause  Open to ablation, will call central pricing first then reach out to me to schedule consult with MD  Colon ca screening current    Patient was counseled today on the new ACS guidelines for cervical cytology screening as well as the current recommendations for breast cancer screening. She was counseled to follow up with her PCP for other routine health maintenance. Counseling session lasted approximately 10 minutes, and all her questions were answered.  For women over the age of 65, you can stop having cervical cancer screenings if you have never had abnormal cervical cells or cervical cancer, and youve had three negative Pap tests in a row. (You also can stop screening if youve had two negative Pap and HPV tests in a row in the past 10 years, with at least one test in the past 5 years.),    Follow-up with me in 1 year for routine exam; pap in 3 years.     55 minutes spent face to face with patient    Total time of 70 minutes. This includes face to face time and non-face to face time preparing to see the patient (eg, review of tests), obtaining and/or reviewing separately obtained history, documenting clinical information in the electronic or other health record, independently interpreting results and communicating results to the patient/family/caregiver, or care coordinator.    As of April 1, 2021, the Cures Act has been passed nationally. This new law requires that all doctors progress notes, lab results, pathology reports  and radiology reports be released IMMEDIATELY to the patient in the patient portal. That means that the results are released to you at the EXACT same time they are released to me. Therefore, with all of the patients that I have I am not able to reply to each patient exactly when the results come in. So there will be a delay from when you see the results to when I see them and have time to come up with a response to send you. Also I only see these results when I am on the computer at work. So if the results come in over the weekend or after 5 pm of a work day, I will not see them until the next business day. As you can tell, this is a challenge as a provider to give every patient the quick response they hope for and deserve. So please be patient!     Thanks for your understanding and patience.

## 2024-07-19 NOTE — PATIENT INSTRUCTIONS
Call to make an appointment within [facility] for psychiatry/psychology [phone]    [facility] number [phone]    Other psychiatrists:  Psychiatry:  Dr. Ze Ken- (psychiatrist) [phone], [phone] [address] [facility]  Beverley Wilcox ([facility]) in [address] of [place]-counseling  Xochitl Medina NP     Therapists:  Raine Pierce: [phone]   Monique Slaughter: [phone]  Shanelle Verma: [phone]  Aura Gallegos: [phone]  Xochitl Gonzalez: [phone]    [facility]- offers individual, couples, family, and group therapy  (P) [phone]  Website [email or web address]  Email [email or web address]  Location- [address] [facility] [address]    Dr. Katy Ledezma-pyschologist at [facility] and does therapy (offers virtual visits)   Call [phone] to make an appointment    Cassandra Rockweiler- licensed clinical  who does therapy. Located at [facility] but also offers virtual visits. Call [phone] to make an appointment. Her specialties include  mood and anxiety disorders as well as grief/loss and fertility issues. She does individual and group therapy.    Xochitl So(psychiatrist) [address] Phone: [phone]    Dr. Erasto Willingham - [phone]  Dr. Roslyn Miguel - [phone]  Dr. Shivani Ramirez - [phone]  Dr. Dustin Klein - [phone]    Therapy/Psychology:  You can try anyone of these number to see if your insurance is accepted or you would have to call your insurance.    [facility] ([facility]  Address: [facility] [place] [address]  Phone: [phone]  [email or web address]    [facility] [address], [address]  Phone: [phone]  You can email for an appointment at: [email or web address]    [facility]  [address], [address], [place],  [address]  [email or web address]  Dr. Silva Obrien, [phone] or [email or web address]  Dr. Sylvia Floyd, [phone] or [email or web address]      [facility]:  Sarah Diop -    [phone]    [phone]      [facility] [place]:  Dr. Hernandez Head-Maria Elena -    [phone]      [facility]  (p) [phone]  [address] [place]    [address]     [facility]  (p) [phone]  [address]   [address]  [place]     [address]      Dr. Glen [facility]  (p) [phone]  [address]   [address]  [place]     [address]  Website:  [email or web address]     [facility]  Mental Health Facility in [place], [place]  Address:  [address] [place] [address]  phone: [phone]  LOCAL SUPPORT GROUPS-    [facility]- [phone]    [facility]- social service agency [phone]  Counseling, meetings, holistic care  Fees are based on a sliding scale based on household income

## 2024-07-22 ENCOUNTER — PATIENT MESSAGE (OUTPATIENT)
Dept: OBSTETRICS AND GYNECOLOGY | Facility: CLINIC | Age: 49
End: 2024-07-22
Payer: COMMERCIAL

## 2024-07-31 ENCOUNTER — PATIENT MESSAGE (OUTPATIENT)
Dept: OBSTETRICS AND GYNECOLOGY | Facility: CLINIC | Age: 49
End: 2024-07-31
Payer: COMMERCIAL

## 2024-08-01 ENCOUNTER — PATIENT MESSAGE (OUTPATIENT)
Dept: OBSTETRICS AND GYNECOLOGY | Facility: CLINIC | Age: 49
End: 2024-08-01
Payer: COMMERCIAL

## 2024-08-01 DIAGNOSIS — R87.619: Primary | ICD-10-CM

## 2024-08-05 RX ORDER — IBUPROFEN 800 MG/1
800 TABLET ORAL EVERY 8 HOURS PRN
Qty: 30 TABLET | Refills: 0 | Status: SHIPPED | OUTPATIENT
Start: 2024-08-05 | End: 2025-08-05

## 2024-08-05 RX ORDER — DIAZEPAM 2 MG/1
2 TABLET ORAL
Qty: 1 TABLET | Refills: 0 | Status: SHIPPED | OUTPATIENT
Start: 2024-08-05 | End: 2025-08-05

## 2024-08-06 ENCOUNTER — PROCEDURE VISIT (OUTPATIENT)
Dept: OBSTETRICS AND GYNECOLOGY | Facility: CLINIC | Age: 49
End: 2024-08-06
Payer: COMMERCIAL

## 2024-08-06 VITALS
DIASTOLIC BLOOD PRESSURE: 46 MMHG | HEIGHT: 67 IN | WEIGHT: 181.88 LBS | SYSTOLIC BLOOD PRESSURE: 102 MMHG | BODY MASS INDEX: 28.55 KG/M2

## 2024-08-06 DIAGNOSIS — R87.619: Primary | ICD-10-CM

## 2024-08-06 PROCEDURE — 88175 CYTOPATH C/V AUTO FLUID REDO: CPT | Performed by: NURSE PRACTITIONER

## 2024-08-06 PROCEDURE — 88305 TISSUE EXAM BY PATHOLOGIST: CPT | Performed by: PATHOLOGY

## 2024-08-07 LAB
CLINICAL INFO: NORMAL
DATE OF PREVIOUS PAP: NORMAL
DATE PREVIOUS BX: NO
LMP START DATE: NORMAL
SPECIMEN SOURCE CVX/VAG CYTO: NORMAL

## 2024-08-08 LAB
FINAL PATHOLOGIC DIAGNOSIS: NORMAL
GROSS: NORMAL
Lab: NORMAL

## 2024-09-21 DIAGNOSIS — E78.5 HYPERLIPIDEMIA, UNSPECIFIED HYPERLIPIDEMIA TYPE: ICD-10-CM

## 2024-09-21 RX ORDER — ROSUVASTATIN CALCIUM 5 MG/1
5 TABLET, COATED ORAL DAILY
Qty: 90 TABLET | Refills: 3 | Status: SHIPPED | OUTPATIENT
Start: 2024-09-21

## 2024-09-21 NOTE — TELEPHONE ENCOUNTER
No care due was identified.  [facility] Embedded Care Due Messages. Reference number: [id number].   9/21/2024 3:25:22 AM CDT

## 2024-09-21 NOTE — TELEPHONE ENCOUNTER
Refill Decision Note   Sunita Gan  is requesting a refill authorization.  Brief Assessment and Rationale for Refill:  Approve     Medication Therapy Plan:         Comments:     Note composed:4:54 PM 09/21/2024

## 2024-11-04 ENCOUNTER — E-VISIT (OUTPATIENT)
Dept: FAMILY MEDICINE | Facility: CLINIC | Age: 49
End: 2024-11-04
Payer: COMMERCIAL

## 2024-11-04 DIAGNOSIS — E78.2 MIXED HYPERLIPIDEMIA: Primary | ICD-10-CM

## 2024-11-04 RX ORDER — ROSUVASTATIN CALCIUM 5 MG/1
5 TABLET, COATED ORAL DAILY
Qty: 90 TABLET | Refills: 0 | Status: SHIPPED | OUTPATIENT
Start: 2024-11-04

## 2024-11-04 NOTE — PROGRESS NOTES
Patient ID: Sunita Gan is a 49 y.o. female.    Chief Complaint: General Illness (Entered automatically based on patient selection in [employer].)          274}  The patient initiated a request through [employer] on 11/4/2024 for evaluation and management with a chief complaint of General Illness (Entered automatically based on patient selection in [employer].)     I evaluated the questionnaire submission on 11/04/2024 .    Total Time (in minutes): 7     [facility] Peq Evisit Supergroup-Medication    11/4/2024  9:35 AM CST - Filed by Patient   What do you need help with? Other Concern   Do you agree to participate in an E-Visit? Yes   If you have any of the following symptoms, please present to your local emergency room or call 911:  I acknowledge   Select all that apply: None of the above   What is the main issue you would like addressed today? My doctor retired and i probably need my statins.   Please describe your symptoms None   Where is your problem located? It is about medication.   How severe are your symptoms? Mild   Have you had these symptoms before? No   How long have you been having these symptoms? Just today   Please list any medications or treatments you have used for your condition and indicate if it was effective or not. I am supposed to be taking statins but i cannot get my prescription refilled because my doctor retired.   What makes this feel better? Probably taking my medication regularly   What makes this feel worse? Probably not taking my medication?   Are these symptoms related to a condition that you currently have? I am not sure   What is the condition? I need my cholesterol medicine   When were you last seen for this condition? 6/6/2024   Please describe any probable cause for these symptoms High cholesterol   Provide any additional information you feel is important. Really i just need to talk to a human and get my prescription filled.   Please attach any relevant images or files    Are you able to  take your vital signs? No          Active Problem List with Overview Notes    Diagnosis Date Noted    High risk HPV infection 05/26/2023    Family history of sudden cardiac death 03/18/2023    Gastroesophageal reflux disease 03/18/2023    Metrorrhagia 03/18/2023    Hypercholesterolemia 03/18/2023    BMI 29.0-29.9,adult 07/18/2022    Hyperlipidemia 07/18/2022      Recent Labs Obtained:  Lab Results   Component Value Date    WBC 4.33 07/16/2024    HGB 12.6 07/16/2024    HCT 38.7 07/16/2024    MCV 92 07/16/2024     07/16/2024     07/16/2024    K 4.2 07/16/2024    GLU 91 07/16/2024    CREATININE 0.9 07/16/2024    EGFRNORACEVR >60.0 07/16/2024    HGBA1C 4.7 07/16/2024    TSH 2.929 07/16/2024      Review of patient's allergies indicates:   Allergen Reactions    Avocado (laurus persea) Itching, Shortness Of Breath and Swelling       Encounter Diagnosis   Name Primary?    Mixed hyperlipidemia Yes        No orders of the defined types were placed in this encounter.     Medications Ordered This Encounter   Medications    rosuvastatin (CRESTOR) 5 MG tablet     Sig: Take 1 tablet (5 mg total) by mouth once daily.     Dispense:  90 tablet     Refill:  0        E-Visit Time Tracking:    Day 1 Time (in minutes): 7    Total Time (in minutes): 7      274}

## 2024-11-27 ENCOUNTER — HOSPITAL ENCOUNTER (OUTPATIENT)
Dept: RADIOLOGY | Facility: OTHER | Age: 49
Discharge: HOME | End: 2024-11-27
Attending: NURSE PRACTITIONER
Payer: COMMERCIAL

## 2024-11-27 DIAGNOSIS — Z12.31 VISIT FOR SCREENING MAMMOGRAM: ICD-10-CM

## 2024-11-27 PROCEDURE — 77067 SCR MAMMO BI INCL CAD: CPT | Mod: TC

## 2024-12-19 PROBLEM — E78.2 MIXED HYPERLIPIDEMIA: Status: ACTIVE | Noted: 2022-07-18

## 2024-12-19 PROBLEM — E78.00 HYPERCHOLESTEROLEMIA: Status: RESOLVED | Noted: 2023-03-18 | Resolved: 2024-12-19

## 2024-12-19 NOTE — PROGRESS NOTES
FAMILY MEDICINE  [facility]    Reason for visit:   Chief Complaint   Patient presents with    Establish Care         SUBJECTIVE: Sunita Gan is a 49 y.o. female  - hyperlipidemia and family history of breast cancer presents as a new patient to establish care and refill cholesterol medication. Last PCP Romelia Peres MD    Gynecology: Freida Cedillo, NP    Sunita is a 49-year-old female establishing care with a new PCP following the senior living of her previous doctor, Dr. Romelia Patel. She reports being on a well-tolerated statin medication. Blood work in June showed favorable results with low LDL and high HDL levels on a low-dose statin.Sunita discusses weight management, having previously lost a significant amount but recently regaining some. She is currently working on weight loss again, previously using the Ideal Protein diet but finding it unsustainable long-term. She expresses a desire for sustainable weight loss through lifestyle changes.    Family history of breast cancer is reported. Her sister Ruchi had ductal carcinoma in situ that became infiltrative at age 39, undergoing a double mastectomy. Another sister, Frida, had a prophylactic double mastectomy due to high breast cancer risk at around age 48-49. Sunita participated in the [place] Study, including genetic testing for breast cancer risk factors, with results showing no elevated risk.    1. Hyperlipidemia  - none  - LDL goal < 100    Current treatment:  rosuvastatin (CRESTOR) 5 MG tablet, Take 1 tablet (5 mg total) by mouth once daily., Disp: 90 tablet, Rfl: 0   treatment: none    Exercise: moderately active  Diet: regular diet    Family history of CAD: Yes  Family history of CVA: No    Lab Results       Component                Value               Date                       CHOL                     135                 07/16/2024                 CHOL                     156                 07/26/2023                  CHOL                     173                 10/24/2022            Lab Results       Component                Value               Date                       HDL                      61                  07/16/2024                 HDL                      65                  07/26/2023                 HDL                      51                  10/24/2022            Lab Results       Component                Value               Date                       LDLCALC                  65.2                07/16/2024                 LDLCALC                  77.4                07/26/2023                 LDLCALC                  106.4               10/24/2022            Lab Results       Component                Value               Date                       TRIG                     44                  07/16/2024                 TRIG                     68                  07/26/2023                 TRIG                     78                  10/24/2022            Lab Results       Component                Value               Date                       CHOLHDL                  45.2                07/16/2024                 CHOLHDL                  41.7                07/26/2023                 CHOLHDL                  29.5                10/24/2022                    History of Present Illness    CHIEF COMPLAINT:  Sunita presents for an annual wellness visit and to establish care with a new PCP following the care home of her previous doctor.    HPI:  Sunita is a 49-year-old female establishing care with a new PCP following the care home of her previous doctor, Dr. Romelia Patel. She reports being on a well-tolerated statin medication. Blood work in June showed favorable results with low LDL and high HDL levels on a low-dose statin.    Sunita discusses weight management, having previously lost a significant amount but recently regaining some. She is currently working on weight loss again, previously using the Ideal Protein diet  but finding it unsustainable long-term. She expresses a desire for sustainable weight loss through lifestyle changes.    Family history of breast cancer is reported. Her sister Ruchi had ductal carcinoma in situ that became infiltrative at age 39, undergoing a double mastectomy. Another sister, Frida, had a prophylactic double mastectomy due to high breast cancer risk at around age 48-49. Sunita participated in the [place] Study, including genetic testing for breast cancer risk factors, with results showing no elevated risk.    Sunita reports visual changes since age 42, describing significant deterioration in eyesight and expressing a desire for thorough eye exam. She recalls an optometrist noting small crystals in the posterior segment of one eye during a previous [facility] exam.    Currently, the patient reports cold-like symptoms, expressing concern about their duration and hoping for quick resolution.    MEDICATIONS:  Sunita is on a low dose statin. She is also prescribed an iron supplement, but has not been taking it recently.    MEDICAL HISTORY:  Sunita has a history of hyperlipidemia, which is managed with statin medication. She also has Gilbert's syndrome, a genetic condition causing elevated bilirubin. Sunita has a 14-year-old daughter. She received a tetanus vaccine in either 2017 or 2018 in [place], [place].    FAMILY HISTORY:  Family history is significant for sister Ruchi, who had ductal carcinoma in situ that infiltrated at age 39 and underwent a double mastectomy. Her sister Frida had a high probability of breast cancer and underwent a prophylactic double mastectomy around age 48-49. Sunita's father had myelofibrosis, and her brother possibly has Gilbert's syndrome.    SURGICAL HISTORY:  Sunita completed a colonoscopy before March 23rd.    TEST RESULTS:  In June, the patient's bloodwork showed low LDL and high HDL levels. Her iron level was checked in July. Sunita's bilirubin is high, possibly due to Gilbert's  syndrome, while other liver function tests are normal.    IMAGING:  Sunita recently completed a mammogram.    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      ROS:  ROS as indicated in HPI.           Review of Systems   All other systems reviewed and are negative.      HEALTH MAINTENANCE:   Health Maintenance   Topic Date Due    Influenza Vaccine (1) 09/01/2024    COVID-19 Vaccine (4 - 2024-25 season) 09/01/2024    Mammogram  11/27/2025    TETANUS VACCINE  01/01/2027    Hemoglobin A1c (Diabetic Prevention Screening)  07/16/2027    Lipid Panel  07/16/2029    Cervical Cancer Screening  07/19/2029    Colorectal Cancer Screening  03/25/2033    RSV Vaccine (Age 60+ and Pregnant patients) (1 - 1-dose 75+ series) 03/22/2052    Hepatitis C Screening  Completed    HIV Screening  Completed    Pneumococcal Vaccines (Age 0-49)  Aged Out     Health Maintenance Topics with due status: Not Due       Topic Last Completion Date    TETANUS VACCINE 01/01/2017    Colorectal Cancer Screening 03/25/2023    Hemoglobin A1c (Diabetic Prevention Screening) 07/16/2024    Lipid Panel 07/16/2024    Cervical Cancer Screening 08/06/2024    Mammogram 11/27/2024    RSV Vaccine (Age 60+ and Pregnant patients) Not Due     Health Maintenance Due   Topic Date Due    Influenza Vaccine (1) 09/01/2024    COVID-19 Vaccine (4 - 2024-25 season) 09/01/2024       HISTORY:   Past Medical History:   Diagnosis Date    Allergy     Family history of sudden cardiac death 03/18/2023    High risk HPV infection 05/26/2023    Metrorrhagia 03/18/2023    Miscarriage        Past Surgical History:   Procedure Laterality Date    CHOLECYSTECTOMY      COLONOSCOPY N/A 3/25/2023    Procedure: COLONOSCOPY;  Surgeon: New Mckeon MD;  Location: [facility] ([address] [place] [address]);  Service: Endoscopy;  Laterality: N/A;  would like to to it at [facility]  konstantin via portal - PC  no answer precall - jm       Family  "History   Problem Relation Name Age of Onset    Myelodysplastic syndrome Father          55 when passed away    Coronary artery disease Father      Breast cancer Sister  39        double mastectomy DCIS    Cancer Paternal Grandmother          stomach CA       Social History     Tobacco Use    Smoking status: Never    Smokeless tobacco: Never   Substance Use Topics    Alcohol use: Yes     Comment: very rarely    Drug use: Never       Social History     Social History Narrative    From [facility], moved back from [place].  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 Daughter (2024 13 yo).  No regular exercise.  Never smoker.       ALLERGIES:   Review of patient's allergies indicates:   Allergen Reactions    Avocado (laurus persea) Itching, Shortness Of Breath and Swelling       MEDS:   Current Outpatient Medications on File Prior to Visit   Medication Sig Dispense Refill Last Dose/Taking    multivitamin (THERAGRAN) per tablet Take 1 tablet by mouth.   Taking    ferrous sulfate 325 (65 FE) MG EC tablet Take 1 tablet (325 mg total) by mouth once daily. TAKE 1 TABLET BY MOUTH EVERYDAY WITH BREAKFAST ON AN EMPTY STOMACH WITH ORANGE JUICE (Patient not taking: Reported on 12/24/2024) 90 tablet 3 Not Taking    ibuprofen (ADVIL,MOTRIN) 800 MG tablet Take 1 tablet (800 mg total) by mouth every 8 (eight) hours as needed for Pain. (Patient not taking: Reported on 12/24/2024) 30 tablet 0 Not Taking         Vital signs:   Vitals:    12/24/24 0857   BP: 110/76   Patient Position: Sitting   Pulse: 66   SpO2: 100%   Weight: 83 kg (182 lb 14 oz)   Height: 5' 7" (1.702 m)     Body mass index is 28.64 kg/m².    PHYSICAL EXAM:     Physical Exam  Vitals reviewed.   Constitutional:       General: She is not in acute distress.  HENT:      Head: Normocephalic and atraumatic.      Right Ear: Tympanic membrane and ear canal normal.      Left Ear: Tympanic membrane and ear canal normal.   Eyes:      General: No scleral icterus.     " Conjunctiva/sclera: Conjunctivae normal.   Neck:      Thyroid: No thyromegaly.      Vascular: No carotid bruit.   Cardiovascular:      Rate and Rhythm: Normal rate and regular rhythm.      Heart sounds: Normal heart sounds. No murmur heard.     No friction rub. No gallop.   Pulmonary:      Effort: Pulmonary effort is normal.      Breath sounds: Normal breath sounds. No wheezing, rhonchi or rales.   Abdominal:      General: Bowel sounds are normal. There is no distension.      Palpations: Abdomen is soft.      Tenderness: There is no abdominal tenderness.   Musculoskeletal:      Cervical back: Normal range of motion and neck supple.      Right lower leg: No edema.      Left lower leg: No edema.   Lymphadenopathy:      Cervical: No cervical adenopathy.   Skin:     General: Skin is warm.      Capillary Refill: Capillary refill takes less than 2 seconds.   Neurological:      Mental Status: She is alert.             PERTINENT RESULTS:   No visits with results within 1 Week(s) from this visit.   Latest known visit with results is:   Procedure visit on 08/06/2024   Component Date Value Ref Range Status    Final Pathologic Diagnosis 08/06/2024 Small fragments of secretory endometrium, no atypical hyperplasia or malignancy identified.   Final    Interp By Marcial Lu M.D., Signed on 08/08/2024 at 08:37    Gross 08/06/2024    Final                    Value:Patient ID/MRN:  [id number]   Pathology label MRN:  [id number]    The specimen is received in formalin lacking a written label, but presumed &quot;EMBx&quot; per the requisition form. The specimen consists of multiple tan-yellow to tan-red soft tissue fragments admixed with hemorrhagic material and semitranslucent   mucoid material measuring 1.0 x 1.0 x 0.6 cm in aggregate. The specimen is submitted entirely in cassette [id number].    Lanie Beckham Technologist      Disclaimer 08/06/2024 Unless the case is a 'gross only' or additional testing only, the final  diagnosis for each specimen is based on a microscopic examination of appropriate tissue sections.   Final    Cytology ThinPrep Pap Source 08/06/2024 Cervix   Final    Cytology ThinPrep Pap Report Status 08/06/2024 DNR   Final    Cytology Thinprep PAP Clinical His* 08/06/2024 Routine exam   Corrected    Cytology ThinPrep Pap LMP 08/06/2024 07/13/2024   Final    Cytology ThinPrep Previous PAP 08/06/2024 Unknown   Final    Cytology ThinPrep Previous Biopsy 08/06/2024 No   Final    Cytology ThinPrep PAP Adequacy 08/06/2024 SEE BELOW   Final    Comment: Specimen processed and examined, but unsatisfactory for evaluation   due   to an insufficient number of squamous cells.      Cytology ThinPrep PAP General Monica* 08/06/2024 DNR   Final    Cytology ThinPrep PAP Interpretati* 08/06/2024 SEE BELOW   Final    Comment: Cytology Results: Unable to provide interpretation due to   unsatisfactory specimen adequacy.      Cytology ThinPrep PAP Comment 08/06/2024 SEE BELOW   Final    Comment: This Pap test has been evaluated with computer  assisted technology.  Suggest clinical correlation and follow-up as  clinically appropriate  Microscopic features present suggestive of an interfering substance,  including cellular debris, mucus, or possible lubricant (patient or  provider use). Use of lubricant is not recommended.      Cytotechnologist 08/06/2024 SEE BELOW   Final    Comment: ABBY STEWARD(ASCP) CT screening location: [facility] [place] [address] CLIA ID NUMBER [id number]   Slide   Preparation at [facility] [address] [place] [address]   and CLIA No.:[id number]      Review Cytotechnologist 08/06/2024 SEE BELOW   Final    Comment: ABBY YOUNGBLOOD(ASCP) CT screening location: [facility] [place] [address] CLIA ID NUMBER [id number]   Slide   Preparation at [facility] [address] [place] [address]   and CLIA No.:[id number]       Pathologist 08/06/2024 DNR   Final    Cytology ThinPrep PAP Infection 08/06/2024 DNR   Final    Cytology Thin Prep Pap Explanation 08/06/2024 SEE BELOW   Final    Comment: EXPLANATORY NOTE:     The Pap is a screening test for cervical cancer. It is   not a diagnostic test and is subject to false negative   and false positive results. It is most reliable when a   satisfactory sample, regularly obtained, is submitted   with relevant clinical findings and history, and when   the Pap result is evaluated along with historic and   current clinical information.    TEST PERFORMED AT:  [facility] [address] [address]  LINN CARPENTER M.D.       Lab Results   Component Value Date    WBC 4.33 07/16/2024    HGB 12.6 07/16/2024    HCT 38.7 07/16/2024    MCV 92 07/16/2024     07/16/2024       CMP  Sodium   Date Value Ref Range Status   07/16/2024 141 136 - 145 mmol/L Final     Potassium   Date Value Ref Range Status   07/16/2024 4.2 3.5 - 5.1 mmol/L Final     Chloride   Date Value Ref Range Status   07/16/2024 110 95 - 110 mmol/L Final     CO2   Date Value Ref Range Status   07/16/2024 23 23 - 29 mmol/L Final     Glucose   Date Value Ref Range Status   07/16/2024 91 70 - 110 mg/dL Final     BUN   Date Value Ref Range Status   07/16/2024 11 6 - 20 mg/dL Final     Creatinine   Date Value Ref Range Status   07/16/2024 0.9 0.5 - 1.4 mg/dL Final     Calcium   Date Value Ref Range Status   07/16/2024 9.2 8.7 - 10.5 mg/dL Final     Total Protein   Date Value Ref Range Status   07/16/2024 6.5 6.0 - 8.4 g/dL Final     Albumin   Date Value Ref Range Status   07/16/2024 3.8 3.5 - 5.2 g/dL Final     Total Bilirubin   Date Value Ref Range Status   07/16/2024 1.5 (H) 0.1 - 1.0 mg/dL Final     Comment:     For infants and newborns, interpretation of results should be based  on gestational age, weight and in agreement with clinical  observations.    Premature Infant recommended reference ranges:  Up  to 24 hours.............<8.0 mg/dL  Up to 48 hours............<12.0 mg/dL  3-5 days..................<15.0 mg/dL  6-29 days.................<15.0 mg/dL       Alkaline Phosphatase   Date Value Ref Range Status   07/16/2024 32 (L) 55 - 135 U/L Final     AST   Date Value Ref Range Status   07/16/2024 21 10 - 40 U/L Final     ALT   Date Value Ref Range Status   07/16/2024 18 10 - 44 U/L Final     Anion Gap   Date Value Ref Range Status   07/16/2024 8 8 - 16 mmol/L Final     eGFR   Date Value Ref Range Status   07/16/2024 >60.0 >60 mL/min/1.73 m^2 Final     Lab Results   Component Value Date    CHOL 135 07/16/2024    CHOL 156 07/26/2023    CHOL 173 10/24/2022     Lab Results   Component Value Date    HDL 61 07/16/2024    HDL 65 07/26/2023    HDL 51 10/24/2022     Lab Results   Component Value Date    LDLCALC 65.2 07/16/2024    LDLCALC 77.4 07/26/2023    LDLCALC 106.4 10/24/2022     Lab Results   Component Value Date    TRIG 44 07/16/2024    TRIG 68 07/26/2023    TRIG 78 10/24/2022       Lab Results   Component Value Date    CHOLHDL 45.2 07/16/2024    CHOLHDL 41.7 07/26/2023    CHOLHDL 29.5 10/24/2022     Lab Results   Component Value Date    HGBA1C 4.7 07/16/2024     Lab Results   Component Value Date    TSH 2.929 07/16/2024     Lab Results   Component Value Date    IRON 97 07/16/2024    TRANSFERRIN 210 07/16/2024    TIBC 311 07/16/2024    FESATURATED 31 07/16/2024      Mammo Digital Screening Bilat w/ Ravin  Narrative: Facility:  [facility] [address] [place] [id number] [phone]    Name: Sunita Gan    MRN: [id number]    Result:  Mammo Digital Screening Bilat w/ Ravin    History:  Patient is 49 y.o. and is seen for a screening mammogram.    Films Compared:  Compared to: 11/22/2023 Mammo Digital Screening Bilat w/ Ravin and   10/24/2022 Mammo Digital Screening Bilat w/ Ravin     Findings:  This procedure was performed using tomosynthesis.   Computer-aided  detection was utilized in the interpretation of this   examination.    There are scattered areas of fibroglandular density. There is no evidence   of suspicious masses, microcalcifications or architectural distortion.  Impression:    No mammographic evidence of malignancy.    BI-RADS Category 1: Negative    Recommendation:  Routine screening mammogram in 1 year is recommended.    Your estimated lifetime risk of breast cancer (to age 85) based on   Tyrer-Cuzick risk assessment model is 17.09%.  According to the American   Cancer Society, patients with a lifetime breast cancer risk of 20% or   higher might benefit from supplemental screening tests, such as screening   breast MRI.    Mica Copeland MD      ASSESSMENT/PLAN:    1. Mixed hyperlipidemia  Overview:  Lab Results   Component Value Date    LDLCALC 65.2 07/16/2024     - well controlled  - continue current management plan   - patient encouraged to notify me with any changes    Orders:  -     rosuvastatin (CRESTOR) 5 MG tablet; Take 1 tablet (5 mg total) by mouth once daily.  Dispense: 90 tablet; Refill: 3    2. Serum total bilirubin elevated  Overview:  Lab Results   Component Value Date    BILITOT 1.5 (H) 07/16/2024     - she reports family history of Gilbert's syndrome   -recommend check direct bili  -asymptomatic   -chronic    Orders:  -     Bilirubin, Direct; Future; Expected date: 07/15/2025    3. Family history of breast cancer  Overview:  - sister DCIS      ORDERS:   Orders Placed This Encounter    TSH    Lipid Panel    Hemoglobin A1C    Comprehensive Metabolic Panel    CBC Auto Differential    Iron and TIBC    Bilirubin, Direct    rosuvastatin (CRESTOR) 5 MG tablet       Vaccines recommended:  Influenza, Tdap COVID-19 update    Follow up in about 7 months (around 7/24/2025) for Annual, Labs. or sooner with any concerns        This note is dictated using the M*Modal Fluency Direct word recognition program. There are word recognition mistakes that  are occasionally missed on review.    Dr. Roma Del Real D.O.   [facility]

## 2024-12-24 ENCOUNTER — OFFICE VISIT (OUTPATIENT)
Dept: PRIMARY CARE CLINIC | Facility: CLINIC | Age: 49
End: 2024-12-24
Attending: FAMILY MEDICINE
Payer: COMMERCIAL

## 2024-12-24 VITALS
OXYGEN SATURATION: 100 % | DIASTOLIC BLOOD PRESSURE: 76 MMHG | BODY MASS INDEX: 28.7 KG/M2 | HEIGHT: 67 IN | HEART RATE: 66 BPM | WEIGHT: 182.88 LBS | SYSTOLIC BLOOD PRESSURE: 110 MMHG

## 2024-12-24 DIAGNOSIS — Z00.01 ENCOUNTER FOR GENERAL ADULT MEDICAL EXAMINATION WITH ABNORMAL FINDINGS: ICD-10-CM

## 2024-12-24 DIAGNOSIS — Z80.3 FAMILY HISTORY OF BREAST CANCER: ICD-10-CM

## 2024-12-24 DIAGNOSIS — R17 SERUM TOTAL BILIRUBIN ELEVATED: ICD-10-CM

## 2024-12-24 DIAGNOSIS — E78.2 MIXED HYPERLIPIDEMIA: Primary | ICD-10-CM

## 2024-12-24 PROBLEM — E66.3 OVERWEIGHT (BMI 25.0-29.9): Status: ACTIVE | Noted: 2022-07-18

## 2024-12-24 PROBLEM — Z82.41 FAMILY HISTORY OF SUDDEN CARDIAC DEATH: Status: RESOLVED | Noted: 2023-03-18 | Resolved: 2024-12-24

## 2024-12-24 PROBLEM — B97.7 HIGH RISK HPV INFECTION: Status: RESOLVED | Noted: 2023-05-26 | Resolved: 2024-12-24

## 2024-12-24 PROBLEM — N92.1 METRORRHAGIA: Status: RESOLVED | Noted: 2023-03-18 | Resolved: 2024-12-24

## 2024-12-24 PROCEDURE — 1159F MED LIST DOCD IN RCRD: CPT | Mod: CPTII,S$GLB,, | Performed by: FAMILY MEDICINE

## 2024-12-24 PROCEDURE — 99999 PR PBB SHADOW E&M-EST. PATIENT-LVL III: CPT | Mod: PBBFAC,,, | Performed by: FAMILY MEDICINE

## 2024-12-24 PROCEDURE — 3074F SYST BP LT 130 MM HG: CPT | Mod: CPTII,S$GLB,, | Performed by: FAMILY MEDICINE

## 2024-12-24 PROCEDURE — 3044F HG A1C LEVEL LT 7.0%: CPT | Mod: CPTII,S$GLB,, | Performed by: FAMILY MEDICINE

## 2024-12-24 PROCEDURE — 3078F DIAST BP <80 MM HG: CPT | Mod: CPTII,S$GLB,, | Performed by: FAMILY MEDICINE

## 2024-12-24 PROCEDURE — 3008F BODY MASS INDEX DOCD: CPT | Mod: CPTII,S$GLB,, | Performed by: FAMILY MEDICINE

## 2024-12-24 PROCEDURE — 1160F RVW MEDS BY RX/DR IN RCRD: CPT | Mod: CPTII,S$GLB,, | Performed by: FAMILY MEDICINE

## 2024-12-24 PROCEDURE — 99214 OFFICE O/P EST MOD 30 MIN: CPT | Mod: S$GLB,,, | Performed by: FAMILY MEDICINE

## 2024-12-24 RX ORDER — ROSUVASTATIN CALCIUM 5 MG/1
5 TABLET, COATED ORAL DAILY
Qty: 90 TABLET | Refills: 3 | Status: SHIPPED | OUTPATIENT
Start: 2024-12-24 | End: 2025-12-24

## 2025-02-02 DIAGNOSIS — E78.2 MIXED HYPERLIPIDEMIA: ICD-10-CM

## 2025-02-02 RX ORDER — ROSUVASTATIN CALCIUM 5 MG/1
5 TABLET, COATED ORAL DAILY
Qty: 90 TABLET | Refills: 1 | Status: SHIPPED | OUTPATIENT
Start: 2025-02-02

## 2025-02-02 NOTE — TELEPHONE ENCOUNTER
No care due was identified.  Health [facility] Embedded Care Due Messages. Reference number: [id number].   2/02/2025 10:45:35 AM CST

## 2025-02-03 NOTE — TELEPHONE ENCOUNTER
Sunita Gan  is requesting a refill authorization.  Brief Assessment and Rationale for Refill:  Approve     Medication Therapy Plan:         Comments:     Note composed:9:48 PM 02/02/2025

## 2025-07-03 NOTE — PROGRESS NOTES
FAMILY MEDICINE  [facility]    Reason for visit:   Chief Complaint   Patient presents with    Annual Exam    Hyperlipidemia    Hormone concerns         SUBJECTIVE: Sunita Gan is a 50 y.o. female  - hyperlipidemia and family history of breast cancer presents for her routine annual physical    Gynecology: Freida Cedillo, NP    Sunita Gan have labs prior to visit it did not a chance to have done.      She is most concerned about perimenopausal symptoms that she has been having for the last year.  She denies any hot flashes.  She reports her menstrual cycle has been irregular.  She reports that she has discuss with her gynecologist.  She reports that she has been having more emotional lability around her menstrual cycle.  She reports that she feels extreme sadness and occasional range.  She reports it does not seem to occur monthly but every 4 months.  She denies any increased stressors.  She reports overall her life is doing well and she is a happy person has never had suffer from anxiety or depression in the past.  She has been exercising regularly.  She has lost weight intentionally since her last visit    1. Hyperlipidemia  - none  - LDL goal < 100    Current treatment:  rosuvastatin (CRESTOR) 5 MG tablet, Take 1 tablet (5 mg total) by mouth once daily., Disp: 90 tablet, Rfl: 0   treatment: none    Exercise: moderately active  Diet: regular diet    Family history of CAD: Yes  Family history of CVA: No                Review of Systems   Psychiatric/Behavioral:  Negative for suicidal ideas.    All other systems reviewed and are negative.      HEALTH MAINTENANCE:   Health Maintenance   Topic Date Due    COVID-19 Vaccine (4 - 2024-25 season) 09/01/2024    Influenza Vaccine (1) 09/01/2025    Mammogram  11/27/2025    TETANUS VACCINE  01/01/2027    Hemoglobin A1c (Diabetic Prevention Screening)  07/16/2027    Lipid Panel  07/16/2029    Cervical Cancer Screening  07/19/2029    Colorectal  Cancer Screening  03/25/2033    RSV Vaccine (Age 60+ and Pregnant patients) (1 - 1-dose 75+ series) 03/22/2052    Hepatitis C Screening  Completed    HIV Screening  Completed    Pneumococcal Vaccines (Age 0-49)  Aged Out     Health Maintenance Topics with due status: Not Due       Topic Last Completion Date    TETANUS VACCINE 01/01/2017    Influenza Vaccine 11/04/2021    Colorectal Cancer Screening 03/25/2023    Hemoglobin A1c (Diabetic Prevention Screening) 07/16/2024    Lipid Panel 07/16/2024    Cervical Cancer Screening 08/06/2024    Mammogram 11/27/2024    RSV Vaccine (Age 60+ and Pregnant patients) Not Due     Health Maintenance Due   Topic Date Due    COVID-19 Vaccine (4 - 2024-25 season) 09/01/2024       HISTORY:   Past Medical History:   Diagnosis Date    Allergy     Family history of sudden cardiac death 03/18/2023    High risk HPV infection 05/26/2023    Metrorrhagia 03/18/2023    Miscarriage        Past Surgical History:   Procedure Laterality Date    CHOLECYSTECTOMY      COLONOSCOPY N/A 3/25/2023    Procedure: COLONOSCOPY;  Surgeon: New Mckeon MD;  Location: [address];  Service: Endoscopy;  Laterality: N/A;  would [facility] to to [facility] at [facility] via portal - PC  no answer precall - jm       Family History   Problem Relation Name Age of Onset    Myelodysplastic syndrome Father          55 when passed away    Coronary artery disease Father      Breast cancer Sister  39        double mastectomy DCIS    Cancer Paternal Grandmother          stomach CA       Social History     Tobacco Use    Smoking status: Never    Smokeless tobacco: Never   Substance Use Topics    Alcohol use: Yes     Comment: very rarely    Drug use: Never       Social History     Social History Narrative    From [facility], moved back from [place].  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 Daughter (2024 13 yo).  No regular exercise.  Never smoker.       ALLERGIES:   Review of patient's allergies  "indicates:   Allergen Reactions    Avocado (laurus persea) Itching, Shortness Of Breath and Swelling       MEDS:   Current Outpatient Medications on File Prior to Visit   Medication Sig Dispense Refill Last Dose/Taking    ferrous sulfate 325 (65 FE) MG EC tablet Take 1 tablet (325 mg total) by mouth once daily. TAKE 1 TABLET BY MOUTH EVERYDAY WITH BREAKFAST ON AN EMPTY STOMACH WITH ORANGE JUICE (Patient not taking: Reported on 12/24/2024) 90 tablet 3     ibuprofen (ADVIL,MOTRIN) 800 MG tablet Take 1 tablet (800 mg total) by mouth every 8 (eight) hours as needed for Pain. (Patient not taking: Reported on 12/24/2024) 30 tablet 0     multivitamin (THERAGRAN) per tablet Take 1 tablet by mouth.       rosuvastatin (CRESTOR) 5 MG tablet Take 1 tablet (5 mg total) by mouth once daily. 90 tablet 1          Vital signs:   Vitals:    07/09/25 0946   BP: 117/81   BP Location: Left arm   Patient Position: Sitting   Pulse: 63   Resp: 18   SpO2: 97%   Weight: 78.5 kg (173 lb 1 oz)   Height: 5' 7" (1.702 m)       Body mass index is 27.11 kg/m².    PHYSICAL EXAM:     Physical Exam  Vitals reviewed.   Constitutional:       General: She is not in acute distress.  HENT:      Head: Normocephalic and atraumatic.      Right Ear: Tympanic membrane and ear canal normal.      Left Ear: Tympanic membrane and ear canal normal.   Eyes:      General: No scleral icterus.     Conjunctiva/sclera: Conjunctivae normal.   Neck:      Thyroid: No thyromegaly.      Vascular: No carotid bruit.   Cardiovascular:      Rate and Rhythm: Normal rate and regular rhythm.      Pulses: Normal pulses.      Heart sounds: Normal heart sounds. No murmur heard.     No friction rub. No gallop.   Pulmonary:      Effort: Pulmonary effort is normal.      Breath sounds: Normal breath sounds. No wheezing, rhonchi or rales.   Abdominal:      General: Bowel sounds are normal. There is no distension.      Palpations: Abdomen is soft.      Tenderness: There is no abdominal " tenderness.   Musculoskeletal:      Cervical back: Normal range of motion and neck supple.      Right lower leg: No edema.      Left lower leg: No edema.   Lymphadenopathy:      Cervical: No cervical adenopathy.   Skin:     General: Skin is warm.      Capillary Refill: Capillary refill takes less than 2 seconds.   Neurological:      Mental Status: She is alert.             PERTINENT RESULTS:   No visits with results within 1 Week(s) from this visit.   Latest known visit with results is:   Procedure visit on 08/06/2024   Component Date Value Ref Range Status    Final Pathologic Diagnosis 08/06/2024 Small fragments of secretory endometrium, no atypical hyperplasia or malignancy identified.   Final    Interp By Marcial Lu M.D., Signed on 08/08/2024 at 08:37    Gross 08/06/2024    Final                    Value:Patient ID/MRN:  [id number]   Pathology label MRN:  [id number]    The specimen is received in formalin lacking a written label, but presumed &quot;EMBx&quot; per the requisition form. The specimen consists of multiple tan-yellow to tan-red soft tissue fragments admixed with hemorrhagic material and semitranslucent   mucoid material measuring 1.0 x 1.0 x 0.6 cm in aggregate. The specimen is submitted entirely in cassette [id number].    Lanie Diaz  Grossing Technologist      Disclaimer 08/06/2024 Unless the case is a 'gross only' or additional testing only, the final diagnosis for each specimen is based on a microscopic examination of appropriate tissue sections.   Final    Cytology ThinPrep Pap Source 08/06/2024 Cervix   Final    Cytology ThinPrep Pap Report Status 08/06/2024 DNR   Final    Cytology Thinprep PAP Clinical His* 08/06/2024 Routine exam   Corrected    Cytology ThinPrep Pap LMP 08/06/2024 07/13/2024   Final    Cytology ThinPrep Previous PAP 08/06/2024 Unknown   Final    Cytology ThinPrep Previous Biopsy 08/06/2024 No   Final    Cytology ThinPrep PAP Adequacy 08/06/2024 SEE BELOW   Final     Comment: Specimen processed and examined, but unsatisfactory for evaluation   due   to an insufficient number of squamous cells.      Cytology ThinPrep PAP General Monica* 08/06/2024 DNR   Final    Cytology ThinPrep PAP Interpretati* 08/06/2024 SEE BELOW   Final    Comment: Cytology Results: Unable to provide interpretation due to   unsatisfactory specimen adequacy.      Cytology ThinPrep PAP Comment 08/06/2024 SEE BELOW   Final    Comment: This Pap test has been evaluated with computer  assisted technology.  Suggest clinical correlation and follow-up as  clinically appropriate  Microscopic features present suggestive of an interfering substance,  including cellular debris, mucus, or possible lubricant (patient or  provider use). Use of lubricant is not recommended.      Cytotechnologist 08/06/2024 SEE BELOW   Final    Comment: ABBY STEWADR(ASCP) CT screening location: [facility] [place] [address] CLIA ID NUMBER [id number]   Slide   Preparation at [facility] [place] [address]   and CLIA No.:[id number]      Review Cytotechnologist 08/06/2024 SEE BELOW   Final    Comment: FORD CT(ASCP) CT screening location: [facility] [place] [address] CLIA ID NUMBER [id number]   Slide   Preparation at [facility] [place] [address]   and CLIA No.:[id number]      Pathologist 08/06/2024 DNR   Final    Cytology ThinPrep PAP Infection 08/06/2024 DNR   Final    Cytology Thin Prep Pap Explanation 08/06/2024 SEE BELOW   Final    Comment: EXPLANATORY NOTE:     The Pap is a screening test for cervical cancer. It is   not a diagnostic test and is subject to false negative   and false positive results. It is most reliable when a   satisfactory sample, regularly obtained, is submitted   with relevant clinical findings and history, and when   the Pap result is evaluated along with historic and   current clinical  information.    TEST PERFORMED AT:  [facility] [address] [address]  LINN CARPENTER M.D.       Lab Results   Component Value Date    WBC 4.33 07/16/2024    HGB 12.6 07/16/2024    HCT 38.7 07/16/2024    MCV 92 07/16/2024     07/16/2024       CMP  Sodium   Date Value Ref Range Status   07/16/2024 141 136 - 145 mmol/L Final     Potassium   Date Value Ref Range Status   07/16/2024 4.2 3.5 - 5.1 mmol/L Final     Chloride   Date Value Ref Range Status   07/16/2024 110 95 - 110 mmol/L Final     CO2   Date Value Ref Range Status   07/16/2024 23 23 - 29 mmol/L Final     Glucose   Date Value Ref Range Status   07/16/2024 91 70 - 110 mg/dL Final     BUN   Date Value Ref Range Status   07/16/2024 11 6 - 20 mg/dL Final     Creatinine   Date Value Ref Range Status   07/16/2024 0.9 0.5 - 1.4 mg/dL Final     Calcium   Date Value Ref Range Status   07/16/2024 9.2 8.7 - 10.5 mg/dL Final     Total Protein   Date Value Ref Range Status   07/16/2024 6.5 6.0 - 8.4 g/dL Final     Albumin   Date Value Ref Range Status   07/16/2024 3.8 3.5 - 5.2 g/dL Final     Total Bilirubin   Date Value Ref Range Status   07/16/2024 1.5 (H) 0.1 - 1.0 mg/dL Final     Comment:     For infants and newborns, interpretation of results should be based  on gestational age, weight and in agreement with clinical  observations.    Premature Infant recommended reference ranges:  Up to 24 hours.............<8.0 mg/dL  Up to 48 hours............<12.0 mg/dL  3-5 days..................<15.0 mg/dL  6-29 days.................<15.0 mg/dL       Alkaline Phosphatase   Date Value Ref Range Status   07/16/2024 32 (L) 55 - 135 U/L Final     AST   Date Value Ref Range Status   07/16/2024 21 10 - 40 U/L Final     ALT   Date Value Ref Range Status   07/16/2024 18 10 - 44 U/L Final     Anion Gap   Date Value Ref Range Status   07/16/2024 8 8 - 16 mmol/L Final     eGFR   Date Value Ref Range Status   07/16/2024 >60.0 >60 mL/min/1.73 m^2  Final     Lab Results   Component Value Date    CHOL 135 07/16/2024    CHOL 156 07/26/2023    CHOL 173 10/24/2022     Lab Results   Component Value Date    HDL 61 07/16/2024    HDL 65 07/26/2023    HDL 51 10/24/2022     Lab Results   Component Value Date    LDLCALC 65.2 07/16/2024    LDLCALC 77.4 07/26/2023    LDLCALC 106.4 10/24/2022     Lab Results   Component Value Date    TRIG 44 07/16/2024    TRIG 68 07/26/2023    TRIG 78 10/24/2022       Lab Results   Component Value Date    CHOLHDL 45.2 07/16/2024    CHOLHDL 41.7 07/26/2023    CHOLHDL 29.5 10/24/2022     Lab Results   Component Value Date    HGBA1C 4.7 07/16/2024     Lab Results   Component Value Date    TSH 2.929 07/16/2024     Lab Results   Component Value Date    IRON 97 07/16/2024    TRANSFERRIN 210 07/16/2024    TIBC 311 07/16/2024    FESATURATED 31 07/16/2024      Mammo Digital Screening Bilat w/ Ravin  Narrative: Facility:  [facility] [address] [place] [id number] [phone]    Name: Sunita Gan    MRN: [id number]    Result:  Mammo Digital Screening Bilat w/ Ravin    History:  Patient is 49 y.o. and is seen for a screening mammogram.    Films Compared:  Compared to: 11/22/2023 Mammo Digital Screening Bilat w/ Ravin and   10/24/2022 Mammo Digital Screening Bilat w/ Ravin     Findings:  This procedure was performed using tomosynthesis.   Computer-aided detection was utilized in the interpretation of this   examination.    There are scattered areas of fibroglandular density. There is no evidence   of suspicious masses, microcalcifications or architectural distortion.  Impression:    No mammographic evidence of malignancy.    BI-RADS Category 1: Negative    Recommendation:  Routine screening mammogram in 1 year is recommended.    Your estimated lifetime risk of breast cancer (to age 85) based on   Tyrer-Cuzick risk assessment model is 17.09%.  According to the American   Cancer Society, patients  with a lifetime breast cancer risk of 20% or   higher might benefit from supplemental screening tests, such as screening   breast MRI.    Mica Copeland MD    Narrative  Performed by: [facility]  Patient Name: Sunita Gan  Procedure Date: 3/25/2023 7:56 AM  MRN: [id number]  Account Number: [id number]  YOB: 1975  Age: 48  Room: [address] [place] [address]  Gender: Female  Attending MD: New Mckeon MD, [id number]  Procedure:             Colonoscopy  Indications:           Screening for colorectal malignant neoplasm  Providers:             New Mckeon MD, Delaney Cavanaugh, JOSE MANUEL,                         June Wells CRNA, Chelsey Atkins,                         Technician  Referring MD:          Romelia Patel MD  Complications:         No immediate complications.  Medicines:             Monitored Anesthesia Care  Procedure:             Pre-Anesthesia Assessment:                         - ASA Grade Assessment: II - A patient with mild                         systemic disease.                         - After reviewing the risks and benefits, the                         patient was deemed in satisfactory condition to                         undergo the procedure.                         - The anesthesia plan was to use moderate                         sedation/analgesia (conscious sedation).                         After I obtained informed consent, the scope was                         passed under direct vision. Throughout the                         procedure, the patient's blood pressure, pulse,                         and oxygen saturations were monitored continuously.                         The Olympus scope [id number] ([id number]) was                         introduced through the anus and advanced to the                         terminal ileum. The colonoscopy was performed                         without difficulty. The patient tolerated the                         procedure well. The  quality of the bowel                         preparation was good. The terminal ileum,                         ileocecal valve, appendiceal orifice, and rectum                         were photographed.  Findings:       The perianal and digital rectal examinations were normal. Pertinent       negatives include normal sphincter tone.       The terminal ileum appeared normal.       A 2 mm polyp was found in the proximal rectum. The polyp was       sessile. The polyp was removed with a jumbo cold forceps. Resection       and retrieval were complete. Verification of patient identification       for the specimen was done. Estimated blood loss was minimal.       The exam was otherwise without abnormality on direct and       retroflexion views.  Impression:            - The examined portion of the ileum was normal.                         - One 2 mm polyp in the proximal rectum, removed                         with a jumbo cold forceps. Resected and retrieved.                         - The examination was otherwise normal on direct                         and retroflexion views.  Recommendation:        - Discharge patient to home.                         - Resume previous diet.                         - Continue present medications.                         - Await pathology results.                         - Repeat colonoscopy for surveillance based on                         pathology results.                         - Return to referring physician as previously                         scheduled.  Attending Participation:       I personally performed the entire procedure.  MD New Esparza MD  3/25/2023 8:27:57 AM  This report has been verified and signed electronically.  Dear patient,  As a result of recent federal legislation (The Federal Cures Act), you may  receive lab or pathology results from your procedure in your [facility]  account before your physician is able to contact you. Your  physician or  their representative will relay the results to you with their  recommendations at their soonest availability.  Thank you,  Number of Addenda: 0  Note Initiated On: 3/25/2023 7:56 AM  Scope Withdrawal Time: 0 hours 11 minutes 37 seconds  Estimated Blood Loss:  Estimated blood loss was minimal.       This report has been verified and signed electronically.   Specimen Collected: 03/25/23 07:56 CDT Last Resulted: 03/25/23 08:29 CDT     ASSESSMENT/PLAN:    1. Encounter for general adult medical examination with abnormal findings  Overview:  - preventative health counseling  - reviewed current recommendations for breast cancer screening.  Family history of breast cancer/DCIS sister.  Mammogram due 11/2025 and orders up-to-date TyrBetina: 17.09%  - reviewed current recommendations for cervical cancer screening.  08/04/2024 Pap negative.  Gynecology recommend repeat in 3 years 2027  - reviewed current recommendations for colon cancer screening. 3/2023 colonoscopy normal.  GI recommended repeat in 10 years      Orders:  -     TSH; Future; Expected date: 07/03/2026  -     Lipid Panel; Future; Expected date: 07/03/2026  -     Hemoglobin A1C; Future; Expected date: 07/03/2026  -     Comprehensive Metabolic Panel; Future; Expected date: 07/03/2026  -     CBC Auto Differential; Future; Expected date: 07/03/2026    2. Mixed hyperlipidemia  Overview:  Lab Results   Component Value Date    LDLCALC 65.2 07/16/2024     - repeat lipids today  - continue current management plan       3. Serum total bilirubin elevated  Overview:  Lab Results   Component Value Date    BILITOT 1.5 (H) 07/16/2024     - she reports family history of Gilbert's syndrome   -recommend check direct bili  -asymptomatic   -chronic      4. Perimenopausal  Overview:  - symptoms can be related with perimenopause   -also can be related with premenstrual dysphoric disorder and I discussed with patient   -I discussed considering SSRI  -she does not want  to consider an SSRI at this time but would like to discuss hormone replacement options with gynecology   -referral placed to [facility]  - questions elicited and answered  - encouraged to patient to notify me of any questions or concerns    Orders:  -     Ambulatory referral/consult to [facility]; Future; Expected date: 07/16/2025    5. Family history of breast cancer  Overview:  - sister DCIS            ORDERS:   Orders Placed This Encounter    TSH    Lipid Panel    Hemoglobin A1C    Comprehensive Metabolic Panel    CBC Auto Differential    Ambulatory referral/consult to [facility]         Vaccines recommended:  COVID-19 update    Follow up in about 1 year (around 7/9/2026) for Annual, Labs; pending results. or sooner with any concerns        This note is dictated using the M*Modal Fluency Direct word recognition program. There are word recognition mistakes that are occasionally missed on review.    Dr. Roma Del Real D.O.   Family Medicine

## 2025-07-09 ENCOUNTER — LAB VISIT (OUTPATIENT)
Dept: LAB | Facility: HOSPITAL | Age: 50
End: 2025-07-09
Attending: FAMILY MEDICINE
Payer: COMMERCIAL

## 2025-07-09 ENCOUNTER — OFFICE VISIT (OUTPATIENT)
Dept: PRIMARY CARE CLINIC | Facility: CLINIC | Age: 50
End: 2025-07-09
Attending: FAMILY MEDICINE
Payer: COMMERCIAL

## 2025-07-09 VITALS
BODY MASS INDEX: 27.16 KG/M2 | DIASTOLIC BLOOD PRESSURE: 81 MMHG | HEART RATE: 63 BPM | OXYGEN SATURATION: 97 % | RESPIRATION RATE: 18 BRPM | HEIGHT: 67 IN | SYSTOLIC BLOOD PRESSURE: 117 MMHG | WEIGHT: 173.06 LBS

## 2025-07-09 DIAGNOSIS — Z00.01 ENCOUNTER FOR GENERAL ADULT MEDICAL EXAMINATION WITH ABNORMAL FINDINGS: ICD-10-CM

## 2025-07-09 DIAGNOSIS — E78.2 MIXED HYPERLIPIDEMIA: ICD-10-CM

## 2025-07-09 DIAGNOSIS — Z80.3 FAMILY HISTORY OF BREAST CANCER: ICD-10-CM

## 2025-07-09 DIAGNOSIS — N95.1 PERIMENOPAUSAL: ICD-10-CM

## 2025-07-09 DIAGNOSIS — Z00.01 ENCOUNTER FOR GENERAL ADULT MEDICAL EXAMINATION WITH ABNORMAL FINDINGS: Primary | ICD-10-CM

## 2025-07-09 DIAGNOSIS — R17 SERUM TOTAL BILIRUBIN ELEVATED: ICD-10-CM

## 2025-07-09 LAB
ABSOLUTE EOSINOPHIL (OHS): 0.12 K/UL
ABSOLUTE MONOCYTE (OHS): 0.38 K/UL (ref 0.3–1)
ABSOLUTE NEUTROPHIL COUNT (OHS): 2.42 K/UL (ref 1.8–7.7)
ALBUMIN SERPL BCP-MCNC: 4.2 G/DL (ref 3.5–5.2)
ALP SERPL-CCNC: 36 UNIT/L (ref 40–150)
ALT SERPL W/O P-5'-P-CCNC: 15 UNIT/L (ref 10–44)
ANION GAP (OHS): 8 MMOL/L (ref 8–16)
AST SERPL-CCNC: 18 UNIT/L (ref 11–45)
BASOPHILS # BLD AUTO: 0.04 K/UL
BASOPHILS NFR BLD AUTO: 1 %
BILIRUB DIRECT SERPL-MCNC: 0.3 MG/DL (ref 0.1–0.3)
BILIRUB SERPL-MCNC: 1.3 MG/DL (ref 0.1–1)
BUN SERPL-MCNC: 20 MG/DL (ref 6–20)
CALCIUM SERPL-MCNC: 9.1 MG/DL (ref 8.7–10.5)
CHLORIDE SERPL-SCNC: 106 MMOL/L (ref 95–110)
CHOLEST SERPL-MCNC: 161 MG/DL (ref 120–199)
CHOLEST/HDLC SERPL: 2.7 {RATIO} (ref 2–5)
CO2 SERPL-SCNC: 25 MMOL/L (ref 23–29)
CREAT SERPL-MCNC: 0.9 MG/DL (ref 0.5–1.4)
EAG (OHS): 82 MG/DL (ref 68–131)
ERYTHROCYTE [DISTWIDTH] IN BLOOD BY AUTOMATED COUNT: 13.2 % (ref 11.5–14.5)
GFR SERPLBLD CREATININE-BSD FMLA CKD-EPI: >60 ML/MIN/1.73/M2
GLUCOSE SERPL-MCNC: 87 MG/DL (ref 70–110)
HBA1C MFR BLD: 4.5 % (ref 4–5.6)
HCT VFR BLD AUTO: 36.2 % (ref 37–48.5)
HDLC SERPL-MCNC: 59 MG/DL (ref 40–75)
HDLC SERPL: 36.6 % (ref 20–50)
HGB BLD-MCNC: 12 GM/DL (ref 12–16)
IMM GRANULOCYTES # BLD AUTO: 0.01 K/UL (ref 0–0.04)
IMM GRANULOCYTES NFR BLD AUTO: 0.2 % (ref 0–0.5)
IRON SATN MFR SERPL: 9 % (ref 20–50)
IRON SERPL-MCNC: 35 UG/DL (ref 30–160)
LDLC SERPL CALC-MCNC: 90.2 MG/DL (ref 63–159)
LYMPHOCYTES # BLD AUTO: 1.21 K/UL (ref 1–4.8)
MCH RBC QN AUTO: 28.7 PG (ref 27–31)
MCHC RBC AUTO-ENTMCNC: 33.1 G/DL (ref 32–36)
MCV RBC AUTO: 87 FL (ref 82–98)
NONHDLC SERPL-MCNC: 102 MG/DL
NUCLEATED RBC (/100WBC) (OHS): 0 /100 WBC
PLATELET # BLD AUTO: 202 K/UL (ref 150–450)
PMV BLD AUTO: 11.9 FL (ref 9.2–12.9)
POTASSIUM SERPL-SCNC: 4.1 MMOL/L (ref 3.5–5.1)
PROT SERPL-MCNC: 7.1 GM/DL (ref 6–8.4)
RBC # BLD AUTO: 4.18 M/UL (ref 4–5.4)
RELATIVE EOSINOPHIL (OHS): 2.9 %
RELATIVE LYMPHOCYTE (OHS): 28.9 % (ref 18–48)
RELATIVE MONOCYTE (OHS): 9.1 % (ref 4–15)
RELATIVE NEUTROPHIL (OHS): 57.9 % (ref 38–73)
SODIUM SERPL-SCNC: 139 MMOL/L (ref 136–145)
TIBC SERPL-MCNC: 371 UG/DL (ref 250–450)
TRANSFERRIN SERPL-MCNC: 251 MG/DL (ref 200–375)
TRIGL SERPL-MCNC: 59 MG/DL (ref 30–150)
TSH SERPL-ACNC: 2.35 UIU/ML (ref 0.4–4)
WBC # BLD AUTO: 4.18 K/UL (ref 3.9–12.7)

## 2025-07-09 PROCEDURE — 82248 BILIRUBIN DIRECT: CPT

## 2025-07-09 PROCEDURE — 84443 ASSAY THYROID STIM HORMONE: CPT

## 2025-07-09 PROCEDURE — 99999 PR PBB SHADOW E&M-EST. PATIENT-LVL IV: CPT | Mod: PBBFAC,,, | Performed by: FAMILY MEDICINE

## 2025-07-09 PROCEDURE — 83036 HEMOGLOBIN GLYCOSYLATED A1C: CPT

## 2025-07-09 PROCEDURE — 85025 COMPLETE CBC W/AUTO DIFF WBC: CPT

## 2025-07-09 PROCEDURE — 36415 COLL VENOUS BLD VENIPUNCTURE: CPT | Mod: PN

## 2025-07-09 PROCEDURE — 80061 LIPID PANEL: CPT

## 2025-07-09 PROCEDURE — 83540 ASSAY OF IRON: CPT

## 2025-07-09 PROCEDURE — 80053 COMPREHEN METABOLIC PANEL: CPT

## 2025-08-14 ENCOUNTER — PATIENT MESSAGE (OUTPATIENT)
Dept: PRIMARY CARE CLINIC | Facility: CLINIC | Age: 50
End: 2025-08-14
Payer: COMMERCIAL

## 2025-08-14 DIAGNOSIS — D22.9 SKIN MOLE: Primary | ICD-10-CM

## 2025-08-21 ENCOUNTER — TELEPHONE (OUTPATIENT)
Dept: DERMATOLOGY | Facility: CLINIC | Age: 50
End: 2025-08-21
Payer: COMMERCIAL